# Patient Record
Sex: MALE | Race: OTHER | Employment: FULL TIME | URBAN - METROPOLITAN AREA
[De-identification: names, ages, dates, MRNs, and addresses within clinical notes are randomized per-mention and may not be internally consistent; named-entity substitution may affect disease eponyms.]

---

## 2017-04-20 ENCOUNTER — HOSPITAL ENCOUNTER (OUTPATIENT)
Dept: LAB | Age: 47
Discharge: HOME | End: 2017-04-20

## 2017-04-20 LAB — SENTARA SPECIMEN COL,SENBCF: NORMAL

## 2017-04-20 PROCEDURE — 99001 SPECIMEN HANDLING PT-LAB: CPT | Performed by: FAMILY MEDICINE

## 2018-03-06 ENCOUNTER — HOSPITAL ENCOUNTER (OUTPATIENT)
Dept: PREADMISSION TESTING | Age: 48
Discharge: HOME | End: 2018-03-06
Payer: COMMERCIAL

## 2018-03-06 DIAGNOSIS — Z01.818 PRE-OP TESTING: ICD-10-CM

## 2018-03-06 LAB
ANION GAP SERPL CALC-SCNC: 6 MMOL/L (ref 3–18)
ATRIAL RATE: 76 BPM
BUN SERPL-MCNC: 13 MG/DL (ref 7–18)
BUN/CREAT SERPL: 15 (ref 12–20)
CALCIUM SERPL-MCNC: 9.4 MG/DL (ref 8.5–10.1)
CALCULATED P AXIS, ECG09: 22 DEGREES
CALCULATED R AXIS, ECG10: 13 DEGREES
CALCULATED T AXIS, ECG11: 8 DEGREES
CHLORIDE SERPL-SCNC: 104 MMOL/L (ref 100–108)
CO2 SERPL-SCNC: 28 MMOL/L (ref 21–32)
CREAT SERPL-MCNC: 0.89 MG/DL (ref 0.6–1.3)
DIAGNOSIS, 93000: NORMAL
ERYTHROCYTE [DISTWIDTH] IN BLOOD BY AUTOMATED COUNT: 14.2 % (ref 11.6–14.5)
GLUCOSE SERPL-MCNC: 88 MG/DL (ref 74–99)
HCT VFR BLD AUTO: 39.5 % (ref 36–48)
HGB BLD-MCNC: 13.4 G/DL (ref 13–16)
MCH RBC QN AUTO: 28.5 PG (ref 24–34)
MCHC RBC AUTO-ENTMCNC: 33.9 G/DL (ref 31–37)
MCV RBC AUTO: 83.9 FL (ref 74–97)
P-R INTERVAL, ECG05: 138 MS
PLATELET # BLD AUTO: 292 K/UL (ref 135–420)
PMV BLD AUTO: 8.7 FL (ref 9.2–11.8)
POTASSIUM SERPL-SCNC: 4.5 MMOL/L (ref 3.5–5.5)
Q-T INTERVAL, ECG07: 378 MS
QRS DURATION, ECG06: 78 MS
QTC CALCULATION (BEZET), ECG08: 425 MS
RBC # BLD AUTO: 4.71 M/UL (ref 4.7–5.5)
SODIUM SERPL-SCNC: 138 MMOL/L (ref 136–145)
VENTRICULAR RATE, ECG03: 76 BPM
WBC # BLD AUTO: 7.5 K/UL (ref 4.6–13.2)

## 2018-03-06 PROCEDURE — 85027 COMPLETE CBC AUTOMATED: CPT | Performed by: SURGERY

## 2018-03-06 PROCEDURE — 36415 COLL VENOUS BLD VENIPUNCTURE: CPT | Performed by: SURGERY

## 2018-03-06 PROCEDURE — 93005 ELECTROCARDIOGRAM TRACING: CPT

## 2018-03-06 PROCEDURE — 80048 BASIC METABOLIC PNL TOTAL CA: CPT | Performed by: SURGERY

## 2018-03-08 ENCOUNTER — HOSPITAL ENCOUNTER (OUTPATIENT)
Dept: LAB | Age: 48
Discharge: HOME | End: 2018-03-08

## 2018-03-08 PROCEDURE — 88304 TISSUE EXAM BY PATHOLOGIST: CPT | Performed by: SURGERY

## 2018-03-08 PROCEDURE — 88305 TISSUE EXAM BY PATHOLOGIST: CPT | Performed by: SURGERY

## 2018-05-16 ENCOUNTER — HOSPITAL ENCOUNTER (OUTPATIENT)
Dept: PHYSICAL THERAPY | Age: 48
Discharge: HOME | End: 2018-05-16
Payer: COMMERCIAL

## 2018-05-16 PROCEDURE — 97014 ELECTRIC STIMULATION THERAPY: CPT

## 2018-05-16 PROCEDURE — 97162 PT EVAL MOD COMPLEX 30 MIN: CPT

## 2018-05-16 NOTE — PROGRESS NOTES
PHYSICAL THERAPY - DAILY TREATMENT NOTE    Patient Name: Juany Hardin        Date: 2018  : 1970   YES Patient  Verified  Visit #:     Insurance: Payor: Nicola Backer / Plan: [place] [employer] [place] / Product Type: PPO /      In time: 910 Out time: 10   Total Treatment Time: 50     TREATMENT AREA =  Neck pain [M54.2]  Back pain [M54.9]  Left arm pain [M79.602]    SUBJECTIVE                                                         See IE            OBJECTIVE    Modality rationale: decrease inflammation and decrease pain to improve the patients ability to perform ADLs with less pain. 15 min [] Estim, type: IFC to cervical region                                         [x]  att     []  unatt     []  w/US     []  w/ice    []  w/heat    min []  Mechanical Traction: type/lbs                                               []  pro   []  sup   []  int   []  cont    min []  Ultrasound, settings/location:      min []  Iontophoresis:  []  take home patch w/ dexamethazone    min                                []  in clinic w/ dexamethazone    min []  Ice     []  Heat     position:     min []  Other:    [] Skin assessment post-treatment (if applicable):    []  intact    []  redness- no adverse reaction     []redness  adverse reaction:      T/o tx min Patient Education:  YES  Reviewed HEP   [x] A and P related to current DX [x] LTGs and POC   []  Progressed/Changed HEP based on:         Other Objective/Functional Measures:                                   See IE     ASSESSMENT    [x]  See Initial Evaluation     PLAN    [x]  Upgrade activities as tolerated YES Continue plan of care   []  Discharge due to :    []  Other: Pt will return for follow up and to initiate POC     Therapist: Jayla Vu, PT, DPT, [address]    Date: 2018 Time: 9:22 AM       Future Appointments  Date Time Provider Jerrica Flanagan   2018 8:00 AM Kaz Minor PT [facility]   2018 9:30 AM Greg Gifford PT [facility]   2018 10:30 AM Aleta Amaya [facility]   5/25/2018 9:30 AM Maggie Ge [facility]   5/29/2018 9:00 AM Tish Rosas, PT [facility]   5/31/2018 10:00 AM Tish Rosas, PT [facility]   6/4/2018 10:00 AM Tish Rosas, PT [facility]   6/6/2018 10:00 AM Tish Rosas, PT [facility]   6/8/2018 10:00 AM Tish Rosas, PT [facility]

## 2018-05-17 ENCOUNTER — HOSPITAL ENCOUNTER (OUTPATIENT)
Dept: PHYSICAL THERAPY | Age: 48
Discharge: HOME | End: 2018-05-17
Payer: COMMERCIAL

## 2018-05-17 PROCEDURE — 97014 ELECTRIC STIMULATION THERAPY: CPT

## 2018-05-17 PROCEDURE — 97110 THERAPEUTIC EXERCISES: CPT

## 2018-05-17 PROCEDURE — 97140 MANUAL THERAPY 1/> REGIONS: CPT

## 2018-05-17 NOTE — PROGRESS NOTES
PHYSICAL THERAPY - DAILY TREATMENT NOTE    Patient Name: Sixto Dotson        Date: 2018  : 1970   YES Patient  Verified  Visit #:   2   of   16  Insurance: Payor: Barbee Mcardle / Plan: [place] [employer] / Product Type: PPO /      In time: 810 Out time: 955   Total Treatment Time: 40     Medicare Time Tracking (below)   Total Timed Codes (min):   1:1 Treatment Time:       TREATMENT AREA = Neck pain [M54.2]  Back pain [M54.9]  Left arm pain [M79.602]    SUBJECTIVE  Pain Level (on 0 to 10 scale):  6  / 10   Medication Changes/New allergies or changes in medical history, any new surgeries or procedures?     NO    If yes, update Summary List   Subjective Functional Status/Changes:  []  No changes reported     Most sign pain in L arm        OBJECTIVE    15 min Therapeutic Exercise:  [x]  See flow sheet   Rationale:      increase ROM and dec neural compromise to improve the patients ability to perform ADLs/ work with less paon     15 min Manual Therapy: Technique:      [x] STM[]IASTM[]TPR[x]PROM  - L GH, L hip  [x] Stretching- Cx rot/ UT,   [] SOR[] man traction[]   []OP with REIL  []Jt manipulation []Gr I [] II []  III [] IV[] V[]    Treatment Area:  CS, L GH, L Hip   Rationale:      decrease pain, increase ROM, increase tissue extensibility and decrease trigger points to improve patient's ability to perform ADLs and work activity        Modalities Rationale:     decrease pain and increase tissue extensibility to improve patient's ability to relax  10 min [x] Estim, type/location: ifc to CS                                     []  att     []  unatt     []  w/US     []  w/ice    [x]  w/heat    min []  Mechanical Traction: type/lbs                   []  pro   []  sup   []  int   []  cont    []  before manual    []  after manual    min []  Ultrasound, settings/location:      min []  Iontophoresis w/ dexamethasone, location:                                               []  take home patch       []  in clinic    min [] Ice     []  Heat    location/position:     min []  Vasopneumatic Device, press/temp:     min []  Other:    [x] Skin assessment post-treatment (if applicable):    [x]  intact    [x]  redness- no adverse reaction     []redness  adverse reaction:         min Gait Training:    Rationale:        throughout Rx min Patient Education:  YES  Reviewed HEP   []  Progressed/Changed HEP based on: Other Objective/Functional Measures:    Cx mobility mod restricted with active ROM ext, [place] rot, mild limitation passive rotation    RRIS- Dec intensity     Instructed pt in Cx retract and ulnar nerve glides for HEP- provided written instruction     Post Treatment Pain Level (on 0 to 10) scale:   5  / 10     ASSESSMENT  Assessment/Changes in Function:     Functional improvement:  Progressed HEP for improved mobility/ dec pain   Functional limitation:  Lifting/ work activity      []  See Progress Note/Recertification   Patient will continue to benefit from skilled PT services to modify and progress therapeutic interventions, address functional mobility deficits, address ROM deficits, address strength deficits, analyze and address soft tissue restrictions and analyze and cue movement patterns to attain remaining goals.    Progress toward goals / Updated goals:    Progressed HEP     PLAN  [x]  Upgrade activities as tolerated YES Continue plan of care   []  Discharge due to :    []  Other:      Therapist: Marisabel Molina PT    Date: 5/17/2018 Time: 8:10 AM     Future Appointments  Date Time Provider Jerrica Flanagan   5/18/2018 9:30 AM Delroy Payne PT [facility]   5/21/2018 10:30 AM Demarco Bundy V PTA [facility]   5/25/2018 9:30 AM Tegan Moore PTA [facility]   5/29/2018 9:00 AM Marisabel Molina PT [facility]   5/31/2018 10:00 AM Marisabel Molina PT [facility]   6/4/2018 10:00 AM Marisabel Molina PT [facility]   6/6/2018 10:00 AM Marisabel Molina PT [facility]   6/8/2018 10:00 AM Marisabel Molina PT [facility]

## 2018-05-18 ENCOUNTER — HOSPITAL ENCOUNTER (OUTPATIENT)
Dept: PHYSICAL THERAPY | Age: 48
Discharge: HOME | End: 2018-05-18
Payer: COMMERCIAL

## 2018-05-18 PROCEDURE — 97140 MANUAL THERAPY 1/> REGIONS: CPT

## 2018-05-18 NOTE — PROGRESS NOTES
PHYSICAL THERAPY - DAILY TREATMENT NOTE      Patient Name: Juany Hardin        Date: 2018  : 1970   YES Patient  Verified  Visit #:   3   of   16  Insurance: Payor: Nicola Backer / Plan: [employer] / Product Type: PPO /      In time: 9:35 Out time: 10:14   Total Treatment Time: 29       TREATMENT AREA = Neck pain [M54.2]  Back pain [M54.9]  Left arm pain [M79.602]    SUBJECTIVE    Pain Level (on 0 to 10 scale):  6  / 10   Medication Changes/New allergies or changes in medical history, any new surgeries or procedures? NO    If yes, update Summary List   Subjective Functional Status/Changes:  []  No changes reported     L UE posterior-lateral pain with tingling radiating to L 5th digit       OBJECTIVE    5 min Therapeutic Exercise:  [x]  See flow sheet   Rationale:      increase ROM to improve the patients ability to perform all UE ADL's       24 min Manual Therapy:  c/s paraspinal/scalene/SCM/UT STM. Sustained R c/s SB. manual c/s tx.  Manual chin tuck with c/s ext   Rationale:      decrease pain to improve patient's ability to perform all UE ADL's without pain    Modalities Rationale:     decrease pain to improve patient's ability to perform all UE ADL's without pain     10 min [x] Estim, type/location:  IFC/neck                                    []  att     [x]  unatt     []  w/US     []  w/ice    [x]  w/heat    min []  Mechanical Traction: type/lbs                   []  pro   []  sup   []  int   []  cont    []  before manual    []  after manual    min []  Ultrasound, settings/location:      min []  Iontophoresis w/ dexamethasone, location:                                               []  take home patch       []  in clinic    min []  Ice     []  Heat    location/position:     min []  Vasopneumatic Device, press/temp:     min []  Other:    [x] Skin assessment post-treatment (if applicable):    [x]  intact    [x]  redness- no adverse reaction     []redness  adverse reaction:      Throughout Tx min Patient Education:  YES  Reviewed HEP   []  Progressed/Changed HEP based on: Other Objective/Functional Measures:    6/10 L UE pain and tingling pre-Tx   Post Treatment Pain Level (on 0 to 10) scale:   3  / 10     ASSESSMENT    Assessment/Changes in Function:     3/10 L UE pain post-Tx     []  See Progress Note/Recertification   Patient will continue to benefit from skilled PT services to modify and progress therapeutic interventions, address functional mobility deficits, address ROM deficits, address strength deficits, analyze and address soft tissue restrictions, analyze and cue movement patterns, analyze and modify body mechanics/ergonomics and assess and modify postural abnormalities to attain remaining goals.    Progress toward goals / Updated goals:    Continue c/s ext protocol to meet all goals     PLAN    [x]  Upgrade activities as tolerated YES Continue plan of care   []  Discharge due to :    []  Other:      Therapist: Melyssa Henry PT    Date: 5/18/2018 Time: 10:57 AM   Future Appointments  Date Time Provider Jerrica Flanagan   5/21/2018 10:30 AM Saint Kaufmann, PTA [facility]   5/25/2018 9:30 AM Caitlyn Jefferson V PTA [facility]   5/29/2018 9:00 AM Marine Rosario PT [facility]   5/31/2018 10:00 AM Marine Rosario PT [facility]   6/4/2018 10:00 AM Marine Rosario PT [facility]   6/6/2018 10:00 AM Marine Rosario PT [facility]   6/8/2018 10:00 AM Marine Rosario PT [facility]

## 2018-05-21 ENCOUNTER — HOSPITAL ENCOUNTER (OUTPATIENT)
Dept: PHYSICAL THERAPY | Age: 48
Discharge: HOME | End: 2018-05-21
Payer: COMMERCIAL

## 2018-05-21 PROCEDURE — 97014 ELECTRIC STIMULATION THERAPY: CPT

## 2018-05-21 PROCEDURE — 97140 MANUAL THERAPY 1/> REGIONS: CPT

## 2018-05-21 PROCEDURE — 97110 THERAPEUTIC EXERCISES: CPT

## 2018-05-25 ENCOUNTER — HOSPITAL ENCOUNTER (OUTPATIENT)
Dept: PHYSICAL THERAPY | Age: 48
Discharge: HOME | End: 2018-05-25
Payer: COMMERCIAL

## 2018-05-25 PROCEDURE — 97110 THERAPEUTIC EXERCISES: CPT

## 2018-05-25 PROCEDURE — 97140 MANUAL THERAPY 1/> REGIONS: CPT

## 2018-05-25 NOTE — PROGRESS NOTES
PHYSICAL THERAPY - DAILY TREATMENT NOTE      Patient Name: Glendy Salas        Date: 2018  : 1970   YES Patient  Verified  Visit #:   5   of   16  Insurance: Payor: Elena Brain / Plan: [place] [employer]PO / Product Type: PPO /      In time: 9:40 Out time: 10:30   Total Treatment Time: 40     TREATMENT AREA = Neck pain [M54.2]  Back pain [M54.9]  Left arm pain [M79.602]    SUBJECTIVE    Pain Level (on 0 to 10 scale):  4-5  / 10   Medication Changes/New allergies or changes in medical history, any new surgeries or procedures? NO    If yes, update Summary List   Subjective Functional Status/Changes:  []  No changes reported     Pt reported no longer having tingling in L elbow; L UE pain is less and remains in L UT region. Insidious onset of R UE pain yesterday, which has subsided today       OBJECTIVE    30 min Therapeutic Exercise:  [x]  See flow sheet   Rationale:      increase ROM and increase strength to improve the patients ability to perform all work duties       10 min Manual Therapy:  c/s paraspinals/SCM/scalene/UT STM. R C3-C7 lateral glides. Sustained c/s R rot. Manual c/s tx.  Manual chin tuck with c/s ext   Rationale:      decrease pain to improve patient's ability to perform all UE ADL's    Modalities Rationale:     decrease pain to improve patient's ability to perform all UE ADL's without pain     10 min [x] Estim, type/location: IFC/neck                                    []  att     [x]  unatt     []  w/US     []  w/ice    [x]  w/heat    min []  Mechanical Traction: type/lbs                   []  pro   []  sup   []  int   []  cont    []  before manual    []  after manual    min []  Ultrasound, settings/location:      min []  Iontophoresis w/ dexamethasone, location:                                               []  take home patch       []  in clinic    min []  Ice     []  Heat    location/position:     min []  Vasopneumatic Device, press/temp:     min []  Other:    [x] Skin assessment post-treatment (if applicable):    [x]  intact    [x]  redness- no adverse reaction     []redness  adverse reaction:      Throughout Tx min Patient Education:  YES  Reviewed HEP   []  Progressed/Changed HEP based on: Other Objective/Functional Measures:    4-5/10 neck and L UE pain pre-Tx     Post Treatment Pain Level (on 0 to 10) scale:   0  / 10     ASSESSMENT    Assessment/Changes in Function:     0/10 neck and L UE pain post-Tx     []  See Progress Note/Recertification   Patient will continue to benefit from skilled PT services to modify and progress therapeutic interventions, address functional mobility deficits, address ROM deficits, address strength deficits, analyze and address soft tissue restrictions, analyze and cue movement patterns, analyze and modify body mechanics/ergonomics and assess and modify postural abnormalities to attain remaining goals.    Progress toward goals / Updated goals:    Continue cervical extension protocol to manage L UE radicular s/s     PLAN    [x]  Upgrade activities as tolerated YES Continue plan of care   []  Discharge due to :    []  Other:      Therapist: Kingsley Escobedo PT    Date: 5/25/2018 Time: 9:44 AM   Future Appointments  Date Time Provider Jerrica Flanagan   5/29/2018 9:00 AM Estephanie Perez PT [facility]   5/31/2018 10:00 AM Estephanie Perez PT [facility]   6/4/2018 10:00 AM Estephanie Perez PT [facility]   6/6/2018 10:00 AM Estephanie Perez PT [facility]   6/8/2018 10:00 AM Estephanie Perez PT [facility]

## 2018-05-29 ENCOUNTER — APPOINTMENT (OUTPATIENT)
Dept: PHYSICAL THERAPY | Age: 48
End: 2018-05-29
Payer: COMMERCIAL

## 2018-05-30 ENCOUNTER — HOSPITAL ENCOUNTER (OUTPATIENT)
Dept: PHYSICAL THERAPY | Age: 48
Discharge: HOME | End: 2018-05-30
Payer: COMMERCIAL

## 2018-05-30 PROCEDURE — 97140 MANUAL THERAPY 1/> REGIONS: CPT

## 2018-05-30 PROCEDURE — 97012 MECHANICAL TRACTION THERAPY: CPT

## 2018-05-30 PROCEDURE — 97110 THERAPEUTIC EXERCISES: CPT

## 2018-05-30 NOTE — PROGRESS NOTES
PHYSICAL THERAPY - DAILY TREATMENT NOTE    Patient Name: Bhavesh Maxwell        Date: 2018  : 1970   YES Patient  Verified  Visit #:   6   of   16  Insurance: Payor: Kath Ashton / Plan: [employer] PPO / Product Type: PPO /      In time:  Out time:    Total Treatment Time: 50     Medicare Time Tracking (below)   Total Timed Codes (min):   1:1 Treatment Time:       TREATMENT AREA = Neck pain [M54.2]  Back pain [M54.9]  Left arm pain [M79.602]    SUBJECTIVE  Pain Level (on 0 to 10 scale):  4  / 10   Medication Changes/New allergies or changes in medical history, any new surgeries or procedures? NO    If yes, update Summary List   Subjective Functional Status/Changes:  []  No changes reported     Pain intensity down a little. L UE symptoms moving up the arm.   Now Sx are just in upper arm        OBJECTIVE    25 min Therapeutic Exercise:  [x]  See flow sheet   Rationale:      increase ROM and increase strength to improve the patients ability to perform ADLs with less pain     15 min Manual Therapy: Technique:      [] STM[]IASTM[x]TPR[]PROM[x] Stretching  [] SOR[x] man traction[]   []OP with REIL  []Jt manipulation []Gr I [] II []  III [] IV[] V[]    Treatment Area:  CS/ scap region   Rationale:      decrease pain, increase ROM, increase tissue extensibility and decrease trigger points to improve patient's ability to perform ADLs        Modalities Rationale:     decrease pain, increase tissue extensibility and dec neural compromise to improve patient's ability to perform ADLs   min [] Estim, type/location:                                      []  att     []  unatt     []  w/US     []  w/ice    []  w/heat   10 min [x]  Mechanical Traction: type/lbs 22#                  []  pro   []  sup   []  int   []  cont    []  before manual    []  after manual    min []  Ultrasound, settings/location:      min []  Iontophoresis w/ dexamethasone, location:                                               []  take home patch       []  in clinic    min []  Ice     []  Heat    location/position:     min []  Vasopneumatic Device, press/temp:     min []  Other:    [x] Skin assessment post-treatment (if applicable):    [x]  intact    [x]  redness- no adverse reaction     []redness  adverse reaction:         min Gait Training:    Rationale:        throughout Rx min Patient Education:  YES  Reviewed HEP   []  Progressed/Changed HEP based on: Other Objective/Functional Measures:    *  Added traction and upper back stability ex    Reports dec in radic signs   Post Treatment Pain Level (on 0 to 10) scale:   3-4 / 10     ASSESSMENT  Assessment/Changes in Function:     Functional improvement:  Dec radic signs   Functional limitation:        []  See Progress Note/Recertification   Patient will continue to benefit from skilled PT services to modify and progress therapeutic interventions, address functional mobility deficits, address ROM deficits, address strength deficits and analyze and address soft tissue restrictions to attain remaining goals.    Progress toward goals / Updated goals:    Steady progress     PLAN  [x]  Upgrade activities as tolerated YES Continue plan of care   []  Discharge due to :    []  Other:      Therapist: William Rome PT    Date: 5/30/2018 Time: 10:48 AM     Future Appointments  Date Time Provider Jerrica Flanagan   5/31/2018 10:00 AM William Rome PT [facility]   6/4/2018 10:00 AM William Rome PT [facility]   6/6/2018 10:00 AM William Rome PT [facility]   6/8/2018 10:00 AM William Rome PT [facility]

## 2018-05-31 ENCOUNTER — HOSPITAL ENCOUNTER (OUTPATIENT)
Dept: PHYSICAL THERAPY | Age: 48
Discharge: HOME | End: 2018-05-31
Payer: COMMERCIAL

## 2018-05-31 PROCEDURE — 97012 MECHANICAL TRACTION THERAPY: CPT

## 2018-05-31 PROCEDURE — 97110 THERAPEUTIC EXERCISES: CPT

## 2018-05-31 PROCEDURE — 97140 MANUAL THERAPY 1/> REGIONS: CPT

## 2018-05-31 NOTE — PROGRESS NOTES
PHYSICAL THERAPY - DAILY TREATMENT NOTE    Patient Name: Daphne Victor        Date: 2018  : 1970   YES Patient  Verified  Visit #:   7   of   16  Insurance: Payor: Donavan Rodríguez / Plan: [employer] PPO / Product Type: PPO /      In time: 1010 Out time: 1100   Total Treatment Time: 50     Medicare Time Tracking (below)   Total Timed Codes (min):   1:1 Treatment Time:       TREATMENT AREA = Neck pain [M54.2]  Back pain [M54.9]  Left arm pain [M79.602]    SUBJECTIVE  Pain Level (on 0 to 10 scale):  3-4   10 (elbow)   Medication Changes/New allergies or changes in medical history, any new surgeries or procedures? NO    If yes, update Summary List   Subjective Functional Status/Changes:  []  No changes reported     L elbow is painful with leaning on it or taping it.   No recent hand Sx       OBJECTIVE    25 min Therapeutic Exercise:  [x]  See flow sheet   Rationale:      increase ROM and increase strength to improve the patients ability to perform ADLs with less pain     15* min Manual Therapy: Technique:      [x] STM[]IASTM[x]TPR[]PROM[x] Stretching  [] SOR[x] man traction[]   []OP with REIL  []Jt manipulation []Gr I [] II []  III [] IV[] V[]    Treatment Area:  CS/ scap   Rationale:      decrease pain, increase ROM, increase tissue extensibility and decrease trigger points to improve patient's ability to perform ADLs with less pain        Modalities Rationale:     Dec neural compromise to improve patient's ability to perform ADLs   min [] Estim, type/location:                                      []  att     []  unatt     []  w/US     []  w/ice    []  w/heat   10 min []  Mechanical Traction: type/lbs 25#                  []  pro   []  sup   []  int   []  cont    []  before manual    []  after manual    min []  Ultrasound, settings/location:      min []  Iontophoresis w/ dexamethasone, location:                                               []  take home patch       []  in clinic    min []  Ice     [x] Heat    location/position:     min []  Vasopneumatic Device, press/temp:     min []  Other:    [x] Skin assessment post-treatment (if applicable):    [x]  intact    []  redness- no adverse reaction     []redness  adverse reaction:         min Gait Training:    Rationale:        throughout Rx min Patient Education:  YES  Reviewed HEP   []  Progressed/Changed HEP based on: Other Objective/Functional Measures:    bilat should mobility WNLs exc sl restricted MRE to upper Lx region     Post Treatment Pain Level (on 0 to 10) scale:   3  / 10     ASSESSMENT  Assessment/Changes in Function:     Functional improvement:  Less intense pain, better able to reach with L UE   Functional limitation:  Pain with leaning on L elbow      []  See Progress Note/Recertification   Patient will continue to benefit from skilled PT services to modify and progress therapeutic interventions, address functional mobility deficits, address ROM deficits, address strength deficits, analyze and address soft tissue restrictions and analyze and cue movement patterns to attain remaining goals.    Progress toward goals / Updated goals:    Steady progress with inc ROM, dec pain intensity     PLAN  [x]  Upgrade activities as tolerated YES Continue plan of care   []  Discharge due to :    []  Other:      Therapist: Teodoro Nieto PT    Date: 5/31/2018 Time: 10:17 AM     Future Appointments  Date Time Provider Jerrica Flanagan   6/4/2018 10:00 AM Teodoro Nieto PT [facility]   6/6/2018 10:00 AM Teodoro Nieto PT [facility]   6/8/2018 10:00 AM Teodoro Nieto PT [facility]

## 2018-06-04 ENCOUNTER — HOSPITAL ENCOUNTER (OUTPATIENT)
Dept: PHYSICAL THERAPY | Age: 48
Discharge: HOME | End: 2018-06-04
Payer: COMMERCIAL

## 2018-06-04 PROCEDURE — 97110 THERAPEUTIC EXERCISES: CPT

## 2018-06-04 PROCEDURE — 97530 THERAPEUTIC ACTIVITIES: CPT

## 2018-06-04 PROCEDURE — 97140 MANUAL THERAPY 1/> REGIONS: CPT

## 2018-06-04 PROCEDURE — 97012 MECHANICAL TRACTION THERAPY: CPT

## 2018-06-04 NOTE — PROGRESS NOTES
PHYSICAL THERAPY - DAILY TREATMENT NOTE    Patient Name: Maribel Dotson        Date: 2018  : 1970   YES Patient  Verified  Visit #:   8   of   16  Insurance: Payor: Michi Johnson / Plan: [employer] / Product Type: PPO /      In time: 1010 Out time: 1115   Total Treatment Time: 65     Medicare Time Tracking (below)   Total Timed Codes (min):   1:1 Treatment Time:       TREATMENT AREA = Neck pain [M54.2]  Back pain [M54.9]  Left arm pain [M79.602]    SUBJECTIVE  Pain Level (on 0 to 10 scale):  3  / 10   Medication Changes/New allergies or changes in medical history, any new surgeries or procedures? NO    If yes, update Summary List   Subjective Functional Status/Changes:  []  No changes reported     Primary c/o L elbow pain.   Tx is helpful   No time to do ex's this weekend    Overall- 75% better       OBJECTIVE    30 min Therapeutic Exercise:  [x]  See flow sheet   Rationale:      increase ROM and increase strength to improve the patients ability to perform ADLs with less pain     15 min Manual Therapy: Technique:      [x] STM[]IASTM[x]TPR[]PROM[x] Stretching  [] SOR[x] man traction[]   []OP with REIL  []Jt manipulation []Gr I [] II []  III [] IV[] V[]    Treatment Area: CS/ scap    Rationale:      decrease pain, increase ROM, increase tissue extensibility and decrease trigger points to improve patient's ability to perform ADLs with less pain      Modalities Rationale:     decrease pain and dec neural compromise to improve patient's ability to perform ADLs with less pain   min [] Estim, type/location:                                      []  att     []  unatt     []  w/US     []  w/ice    []  w/heat   10 min [x]  Mechanical Traction: type/lbs 27#                  []  pro   []  sup   []  int   []  cont    []  before manual    [x]  after manual    min []  Ultrasound, settings/location:      min []  Iontophoresis w/ dexamethasone, location:                                               []  take home patch []  in clinic    min []  Ice     []  Heat    location/position:     min []  Vasopneumatic Device, press/temp:     min []  Other:    [x] Skin assessment post-treatment (if applicable):    [x]  intact    []  redness- no adverse reaction     []redness - adverse reaction:        10 min There act: Education of lifting mechanics. Pt demo ability to lift 40# floor to waist with good mechanics   Rationale:        throughout Rx min Patient Education:  YES  Reviewed HEP   []  Progressed/Changed HEP based on: Other Objective/Functional Measures:    Tender to palpation at olecranon     Post Treatment Pain Level (on 0 to 10) scale:   <3  / 10     ASSESSMENT  Assessment/Changes in Function:     Functional improvement: back at work some   Functional limitation:    Pain with lifting    []  See Progress Note/Recertification   Patient will continue to benefit from skilled PT services to modify and progress therapeutic interventions, address functional mobility deficits, address ROM deficits, address strength deficits, analyze and address soft tissue restrictions and analyze and cue movement patterns to attain remaining goals.    Progress toward goals / Updated goals:    Reaching LTG 1     PLAN  [x]  Upgrade activities as tolerated YES Continue plan of care   []  Discharge due to :    []  Other:      Therapist: Yuan Marquis PT    Date: 6/4/2018 Time: 10:35 AM     Future Appointments  Date Time Provider Jerrica Flanagan   6/6/2018 10:00 AM Yuan Marquis PT REHAB CENTER AT [facility]   6/8/2018 10:00 AM Yuan Marquis PT REHAB [facility]ENTER AT [facility]   6/11/2018 10:00 AM Yuan Marquis PT REHAB CENTER AT [facility]   6/13/2018 10:00 AM Yuan Marquis PT REHAB CENTER AT [facility]   6/14/2018 10:00 AM Yuan Marquis PT REHAB CENTER AT [facility]   6/18/2018 9:30 AM Yuan Marquis PT REHAB CENTER AT [facility]

## 2018-06-06 ENCOUNTER — HOSPITAL ENCOUNTER (OUTPATIENT)
Dept: PHYSICAL THERAPY | Age: 48
Discharge: HOME | End: 2018-06-06
Payer: COMMERCIAL

## 2018-06-06 PROCEDURE — 97012 MECHANICAL TRACTION THERAPY: CPT

## 2018-06-06 PROCEDURE — 97110 THERAPEUTIC EXERCISES: CPT

## 2018-06-06 PROCEDURE — 97140 MANUAL THERAPY 1/> REGIONS: CPT

## 2018-06-06 NOTE — PROGRESS NOTES
PHYSICAL THERAPY - DAILY TREATMENT NOTE    Patient Name: Leonard Márquez        Date: 2018  : 1970   YES Patient  Verified  Visit #:   9   of   16  Insurance: Payor: Alina Zamudio / Plan: [place] [employer] [place] / Product Type: PPO /      In time: 1020 Out time: 1130   Total Treatment Time: 70     Medicare Time Tracking (below)   Total Timed Codes (min):   1:1 Treatment Time:       TREATMENT AREA = Neck pain [M54.2]  Back pain [M54.9]  Left arm pain [M79.602]    SUBJECTIVE  Pain Level (on 0 to 10 scale):  3  / 10   Medication Changes/New allergies or changes in medical history, any new surgeries or procedures?     NO    If yes, update Summary List   Subjective Functional Status/Changes:  []  No changes reported     L elbow pain and slight pain in middle of the back     Hand Sx are gone    70-80% improved overall   OBJECTIVE    45 min Therapeutic Exercise:  [x]  See flow sheet   Rationale:      increase ROM and increase strength to improve the patients ability to perform ADLs with less pain     15 min Manual Therapy: Technique:      [x] STM[]IASTM[x]TPR[]PROM[x] Stretching  [] SOR[] man traction[]   []OP with REIL  []Jt manipulation []Gr I [] II []  III [] IV[] V[]    Treatment Area:  CS/ L scap   Rationale:      decrease pain, increase ROM, increase tissue extensibility and decrease trigger points to improve patient's ability to perform ADLs with less pain      Modalities Rationale:     decrease pain and dec neural compromise to improve patient's ability to perform ADLs/ work activity without pain   min [] Estim, type/location:                                      []  att     []  unatt     []  w/US     []  w/ice    []  w/heat   10 min [x]  Mechanical Traction: type/lbs 25#                  []  pro   []  sup   []  int   []  cont    []  before manual    []  after manual    min []  Ultrasound, settings/location:      min []  Iontophoresis w/ dexamethasone, location:                                               []  take home patch       []  in clinic    min []  Ice     []  Heat    location/position:     min []  Vasopneumatic Device, press/temp:     min []  Other:    [x] Skin assessment post-treatment (if applicable):    [x]  intact    [x]  redness- no adverse reaction     []redness - adverse reaction:         min Gait Training:    Rationale:        throughout Rx min Patient Education:  YES  Reviewed HEP   []  Progressed/Changed HEP based on: Other Objective/Functional Measures:    Inc lifts     Post Treatment Pain Level (on 0 to 10) scale:   2-3  / 10     ASSESSMENT  Assessment/Changes in Function:     Functional improvement:  Working without restrictions   Functional limitation:        []  See Progress Note/Recertification   Patient will continue to benefit from skilled PT services to modify and progress therapeutic interventions, address functional mobility deficits, address ROM deficits, address strength deficits, analyze and address soft tissue restrictions and analyze and cue movement patterns to attain remaining goals.    Progress toward goals / Updated goals:    Steady progress towards LTGs     PLAN  [x]  Upgrade activities as tolerated YES Continue plan of care   []  Discharge due to :    []  Other:      Therapist: No Oneal PT    Date: 6/6/2018 Time: 10:19 AM     Future Appointments  Date Time Provider Jerrica Flanagan   6/8/2018 10:00 AM No Oneal PT REHAB CENTER AT [facility]   6/11/2018 10:00 AM No Oneal PT REHAB CENTER AT [facility]   6/13/2018 10:00 AM No Oneal PT REHAB CENTER AT [facility]   6/14/2018 10:00 AM No Oneal PT REHAB CENTER AT [facility]   6/18/2018 9:30 AM No Oneal PT REHAB CENTER AT [facility]

## 2018-06-08 ENCOUNTER — HOSPITAL ENCOUNTER (OUTPATIENT)
Dept: PHYSICAL THERAPY | Age: 48
Discharge: HOME | End: 2018-06-08
Payer: COMMERCIAL

## 2018-06-08 PROCEDURE — 97110 THERAPEUTIC EXERCISES: CPT

## 2018-06-08 PROCEDURE — 97140 MANUAL THERAPY 1/> REGIONS: CPT

## 2018-06-08 NOTE — PROGRESS NOTES
[facility] PHYSICAL THERAPY AT [facility] [address], [address]  Phone: [phone]  Fax: [phone]  PROGRESS NOTE  Patient Name: Sixto Dotson : 1970   Treatment/Medical Diagnosis: Neck pain [M54.2]  Back pain [M54.9]  Left arm pain [M79.602]   Referral Source: Gwen March DO     Date of Initial Visit: 18 Attended Visits: 10 Missed Visits: -     SUMMARY OF TREATMENT  PT has consisted of manual therapy, therapeutic exercise, modalities for pain relief, and patient education of HEP, activity modification, posture and body mechanics  CURRENT STATUS  Patient reports 70-80% overall improvement. He states that hand symptoms are gone. He has returned to full duty work. He reports primary issue is of left elbow region pain made worse with prolonged activity     Previous Goals:  1. Decrease max pain 50-75% to assist with return to work. 2.  Improve FOTO score to 69 points to show functional improvement. 3.  Will rate a +3 on Global Rating of Change and be prepared to DC to HEP     Prior Level/Current Level:  1) Prior Level: na   Current Level: 70-80%   Goal Met? yes  2) Prior Level: 51%   Current Level: 68%   Goal Met? progressing  3) Prior Level: na   Current Level: +5   Goal Met? yes      New Goals to be achieved in __2-3__  weeks:  Continue LTG 2  4. Patient to return to previous ADLs with pain <= 2/10 at worst.    RECOMMENDATIONS  Cont PT 2x/week for 2-3 addl weeks    If you have any questions/comments please contact us directly at [phone]. Thank you for allowing us to assist in the care of your patient. Therapist Signature: Yuan Marquis PT Date: 2018     Time: 12:54 PM   NOTE TO PHYSICIAN:  PLEASE COMPLETE THE ORDERS BELOW AND FAX TO   [facility] at [address]: [phone].   If you are unable to process this request in 24 hours please contact our office: [phone].    ___ I have read the above report and request that my patient continue as recommended.   ___ I have read the above report and request that my patient continue therapy with the following changes/special instructions:_________________________________________________________   ___ I have read the above report and request that my patient be discharged from therapy.      Physician Signature:        Date:       Time:

## 2018-06-08 NOTE — PROGRESS NOTES
PHYSICAL THERAPY - DAILY TREATMENT NOTE    Patient Name: Za Jc        Date: 2018  : 1970   YES Patient  Verified  Visit #:   10   of   16  Insurance: Payor: Lisa Connors / Plan: [place] [employer] PPO / Product Type: PPO /      In time: 1020 Out time: 1110   Total Treatment Time: 50     Medicare Time Tracking (below)   Total Timed Codes (min):   1:1 Treatment Time:       TREATMENT AREA = Neck pain [M54.2]  Back pain [M54.9]  Left arm pain [M79.602]    SUBJECTIVE  Pain Level (on 0 to 10 scale):  2-3  / 10   Medication Changes/New allergies or changes in medical history, any new surgeries or procedures?     NO    If yes, update Summary List   Subjective Functional Status/Changes:  []  No changes reported     Pain still in L elbow region        OBJECTIVE    30* min Therapeutic Exercise:  [x]  See flow sheet   Rationale:      increase ROM and increase strength to improve the patients ability to perform ADLs/ work activity     10 min Manual Therapy: Technique:      [x] STM[]IASTM[x]TPR[]PROM[x] Stretching  [] SOR[] man traction[]   []OP with REIL  []Jt manipulation []Gr I [] II []  III [] IV[] V[]    Treatment Area:  CS/ L scap   Rationale:      decrease pain, increase ROM, increase tissue extensibility and decrease trigger points to improve patient's ability to perform ADLs/ work activity        Modalities Rationale:     decrease pain and increase tissue extensibility to improve patient's ability to perform ADLs   min [] Estim, type/location:                                      []  att     []  unatt     []  w/US     []  w/ice    []  w/heat    min []  Mechanical Traction: type/lbs                   []  pro   []  sup   []  int   []  cont    []  before manual    []  after manual    min []  Ultrasound, settings/location:      min []  Iontophoresis w/ dexamethasone, location:                                               []  take home patch       []  in clinic   10 min []  Ice     [x]  Heat    location/position: min []  Vasopneumatic Device, press/temp:     min []  Other:    [x] Skin assessment post-treatment (if applicable):    [x]  intact    [x]  redness- no adverse reaction     []redness - adverse reaction:         min Gait Training:    Rationale:        throughout Rx min Patient Education:  YES  Reviewed HEP   []  Progressed/Changed HEP based on: Other Objective/Functional Measures:    Lifts 50# with good mechanics  GROC= +5    FOTO= 51%   Post Treatment Pain Level (on 0 to 10) scale:   2  / 10     ASSESSMENT  Assessment/Changes in Function:     Functional improvement:  RTW   Functional limitation:  Arm pain washing car      []  See Progress Note/Recertification   Patient will continue to benefit from skilled PT services to modify and progress therapeutic interventions, address functional mobility deficits, address ROM deficits, address strength deficits, analyze and address soft tissue restrictions, analyze and cue movement patterns and analyze and modify body mechanics/ergonomics to attain remaining goals.    Progress toward goals / Updated goals:    See PN     PLAN  []  Upgrade activities as tolerated YES Continue plan of care   []  Discharge due to :    []  Other:      Therapist: Dav Craft PT    Date: 6/8/2018 Time: 10:18 AM     Future Appointments  Date Time Provider Jerrica Flanagan   6/11/2018 10:00 AM Dav Craft PT REHAB CENTER AT [address]   6/13/2018 10:00 AM Dav Craft, PT REHAB CENTER AT [address]   6/14/2018 10:00 AM Dav Craft, PT REHAB CENTER AT [address]   6/18/2018 9:30 AM Dav Craft, PT REHAB CENTER AT [address]

## 2018-06-11 ENCOUNTER — HOSPITAL ENCOUNTER (OUTPATIENT)
Dept: PHYSICAL THERAPY | Age: 48
Discharge: HOME | End: 2018-06-11
Payer: COMMERCIAL

## 2018-06-11 PROCEDURE — 97110 THERAPEUTIC EXERCISES: CPT

## 2018-06-11 PROCEDURE — 97140 MANUAL THERAPY 1/> REGIONS: CPT

## 2018-06-11 NOTE — PROGRESS NOTES
PHYSICAL THERAPY - DAILY TREATMENT NOTE    Patient Name: Gurdeep Mack        Date: 2018  : 1970   YES Patient  Verified  Visit #:      16  Insurance: Payor: Florin Going / Plan: [place] [employer] PPO / Product Type: PPO /      In time: 1005 Out time: 1110   Total Treatment Time: 65     Medicare Time Tracking (below)   Total Timed Codes (min):   1:1 Treatment Time:       TREATMENT AREA = Neck pain [M54.2]  Back pain [M54.9]  Left arm pain [M79.602]    SUBJECTIVE  Pain Level (on 0 to 10 scale):  2-3  / 10   Medication Changes/New allergies or changes in medical history, any new surgeries or procedures? NO    If yes, update Summary List   Subjective Functional Status/Changes:  []  No changes reported     Feeling OK --elbow pain is going away. Doesn't hurt as bad to lean it on the table.        OBJECTIVE    45 min Therapeutic Exercise:  [x]  See flow sheet   Rationale:      increase ROM and increase strength to improve the patients ability to perform ADL and work activity     10 min Manual Therapy: Technique:      [x] STM[]IASTM[x]TPR[]PROM[x] Stretching  [] SOR[x] man traction[]   []OP with REIL  []Jt manipulation []Gr I [] II []  III [] IV[] V[]    Treatment Area: CS/ scap region    Rationale:      decrease pain, increase ROM, increase tissue extensibility and increase postural awareness to improve patient's ability to perform ADL/ work activity with less pain        Modalities Rationale:     decrease pain and increase tissue extensibility to improve patient's ability to relax   min [] Estim, type/location:                                      []  att     []  unatt     []  w/US     []  w/ice    []  w/heat    min []  Mechanical Traction: type/lbs                   []  pro   []  sup   []  int   []  cont    []  before manual    []  after manual    min []  Ultrasound, settings/location:      min []  Iontophoresis w/ dexamethasone, location:                                               []  take home patch []  in clinic   10 min []  Ice     [x]  Heat    location/position:     min []  Vasopneumatic Device, press/temp:     min []  Other:    [x] Skin assessment post-treatment (if applicable):    [x]  intact    [x]  redness- no adverse reaction     []redness - adverse reaction:         min Gait Training:    Rationale:        throughout Rx min Patient Education:  YES  Reviewed HEP   []  Progressed/Changed HEP based on: Other Objective/Functional Measures: Added wall clocks and body blade for stability     Post Treatment Pain Level (on 0 to 10) scale:   2  / 10     ASSESSMENT  Assessment/Changes in Function:     Functional improvement:  Less pain   Functional limitation:        []  See Progress Note/Recertification   Patient will continue to benefit from skilled PT services to modify and progress therapeutic interventions, address functional mobility deficits, address ROM deficits, address strength deficits and analyze and address soft tissue restrictions to attain remaining goals.    Progress toward goals / Updated goals:    Steady progress     PLAN  [x]  Upgrade activities as tolerated YES Continue plan of care   []  Discharge due to :    []  Other:      Therapist: Desi Brandt PT    Date: 6/11/2018 Time: 10:12 AM     Future Appointments  Date Time Provider Jerrica Flanagan   6/13/2018 10:00 AM Desi Brandt PT REHAB CENTER AT [facility]   6/14/2018 10:00 AM Desi Brandt PT REHAB CENTER AT [facility]   6/18/2018 9:30 AM Desi Brandt PT REHAB CENTER AT [facility]

## 2018-06-13 ENCOUNTER — HOSPITAL ENCOUNTER (OUTPATIENT)
Dept: PHYSICAL THERAPY | Age: 48
Discharge: HOME | End: 2018-06-13
Payer: COMMERCIAL

## 2018-06-13 PROCEDURE — 97140 MANUAL THERAPY 1/> REGIONS: CPT

## 2018-06-13 PROCEDURE — 97110 THERAPEUTIC EXERCISES: CPT

## 2018-06-13 NOTE — PROGRESS NOTES
PHYSICAL THERAPY - DAILY TREATMENT NOTE    Patient Name: Sabino Salvage        Date: 2018  : 1970   YES Patient  Verified  Visit #:      16  Insurance: Payor: Addison Alexandre / Plan: [place] [employer] PPO / Product Type: PPO /      In time: 1005 Out time: 1115   Total Treatment Time: 70     Medicare Time Tracking (below)   Total Timed Codes (min):   1:1 Treatment Time:       TREATMENT AREA = Neck pain [M54.2]  Back pain [M54.9]  Left arm pain [M79.602]    SUBJECTIVE  Pain Level (on 0 to 10 scale):  2  / 10   Medication Changes/New allergies or changes in medical history, any new surgeries or procedures?     NO    If yes, update Summary List   Subjective Functional Status/Changes:  []  No changes reported     Doing good        OBJECTIVE    45 min Therapeutic Exercise:  [x]  See flow sheet   Rationale:      increase ROM and increase strength to improve the patients ability to perform ADLs with less pain     15 min Manual Therapy: Technique:      [x] STM[]IASTM[x]TPR[]PROM[x] Stretching  [] SOR[x] man traction[]   []OP with REIL  []Jt manipulation []Gr I [] II []  III [] IV[] V[]    Treatment Area:CS/ scap     Rationale:      decrease pain, increase ROM, increase tissue extensibility and decrease trigger points to improve patient's ability to perform ADLs with less pain        Modalities Rationale:     decrease pain and increase tissue extensibility to improve patient's ability to relax   min [] Estim, type/location:                                      []  att     []  unatt     []  w/US     []  w/ice    []  w/heat    min []  Mechanical Traction: type/lbs                   []  pro   []  sup   []  int   []  cont    []  before manual    []  after manual    min []  Ultrasound, settings/location:      min []  Iontophoresis w/ dexamethasone, location:                                               []  take home patch       []  in clinic   10 min []  Ice     [x]  Heat    location/position:     min []  Vasopneumatic Device, press/temp:     min []  Other:    [x] Skin assessment post-treatment (if applicable):    [x]  intact    [x]  redness- no adverse reaction     []redness - adverse reaction:         min Gait Training:    Rationale:        throughout Rx min Patient Education:  YES  Reviewed HEP   []  Progressed/Changed HEP based on: Other Objective/Functional Measures:    Inc lifts to 60# with good mechanics     Post Treatment Pain Level (on 0 to 10) scale:   0  / 10     ASSESSMENT  Assessment/Changes in Function:     Functional improvement:  Improved material handling   Functional limitation:        []  See Progress Note/Recertification   Patient will continue to benefit from skilled PT services to modify and progress therapeutic interventions, address functional mobility deficits, address ROM deficits, address strength deficits, analyze and address soft tissue restrictions and assess and modify postural abnormalities to attain remaining goals.    Progress toward goals / Updated goals:    Less intense pain     PLAN  [x]  Upgrade activities as tolerated YES Continue plan of care   []  Discharge due to :    []  Other:      Therapist: Santa Snyder PT    Date: 6/13/2018 Time: 10:18 AM     Future Appointments  Date Time Provider Jerrica Flanagan   6/14/2018 10:00 AM Santa Snyder PT REHAB CENTER AT [facility]   6/18/2018 9:30 AM Santa Snyder PT REHAB CENTER AT [facility]

## 2018-06-14 ENCOUNTER — APPOINTMENT (OUTPATIENT)
Dept: PHYSICAL THERAPY | Age: 48
End: 2018-06-14
Payer: COMMERCIAL

## 2018-06-15 ENCOUNTER — HOSPITAL ENCOUNTER (OUTPATIENT)
Dept: PHYSICAL THERAPY | Age: 48
Discharge: HOME | End: 2018-06-15
Payer: COMMERCIAL

## 2018-06-15 PROCEDURE — 97110 THERAPEUTIC EXERCISES: CPT

## 2018-06-15 PROCEDURE — 97140 MANUAL THERAPY 1/> REGIONS: CPT

## 2018-06-15 NOTE — PROGRESS NOTES
PHYSICAL THERAPY - DAILY TREATMENT NOTE      Patient Name: Gabriella Wetzel        Date: 6/15/2018  : 1970   YES Patient  Verified  Visit #:   15   of   24  Insurance: Payor: Juan Chambers / Plan: [employer] PPO / Product Type: PPO /      In time: 9:40 Out time: 10:45   Total Treatment Time: 55       TREATMENT AREA = Neck pain [M54.2]  Back pain [M54.9]  Left arm pain [M79.602]    SUBJECTIVE    Pain Level (on 0 to 10 scale):  1  / 10   Medication Changes/New allergies or changes in medical history, any new surgeries or procedures? NO    If yes, update Summary List   Subjective Functional Status/Changes:  []  No changes reported     Pt reported mild posterior L elbow pain       OBJECTIVE    45 min Therapeutic Exercise:  [x]  See flow sheet   Rationale:      increase ROM and increase strength to improve the patients ability to perform all UE ADL's       10 min Manual Therapy:  c/s paraspinals/SCM/scalene/UT STM. R SB sustained PROM.  Manual tx   Rationale:      decrease pain to improve patient's ability to perform all UE ADL's    Modalities Rationale:     decrease pain to improve patient's ability to perform all UE ADL's      min [] Estim, type/location:                                      []  att     []  unatt     []  w/US     []  w/ice    []  w/heat    min []  Mechanical Traction: type/lbs                   []  pro   []  sup   []  int   []  cont    []  before manual    []  after manual    min []  Ultrasound, settings/location:      min []  Iontophoresis w/ dexamethasone, location:                                               []  take home patch       []  in clinic   10 min []  Ice     [x]  Heat    location/position: Neck/supine    min []  Vasopneumatic Device, press/temp:     min []  Other:    [x] Skin assessment post-treatment (if applicable):    [x]  intact    [x]  redness- no adverse reaction     []redness - adverse reaction:      Throughout Tx min Patient Education:  YES  Reviewed HEP   [] Progressed/Changed HEP based on: Other Objective/Functional Measures:    1/10 L UE pain pre-Tx     Post Treatment Pain Level (on 0 to 10) scale:   0  / 10     ASSESSMENT    Assessment/Changes in Function:     0/10 L UE pain post-Tx     []  See Progress Note/Recertification   Patient will continue to benefit from skilled PT services to modify and progress therapeutic interventions, address functional mobility deficits, address ROM deficits, address strength deficits, analyze and address soft tissue restrictions, analyze and cue movement patterns, analyze and modify body mechanics/ergonomics and assess and modify postural abnormalities to attain remaining goals.    Progress toward goals / Updated goals:    Continue strengthening to meet all goals     PLAN    [x]  Upgrade activities as tolerated YES Continue plan of care   []  Discharge due to :    []  Other:      Therapist: Melyssa Henry PT    Date: 6/15/2018 Time: 10:09 AM   Future Appointments  Date Time Provider Jerrica Flanagan   6/18/2018 9:30 AM Marine Rosario, PT REHAB CENTER AT [facility]

## 2018-06-18 ENCOUNTER — HOSPITAL ENCOUNTER (OUTPATIENT)
Dept: PHYSICAL THERAPY | Age: 48
Discharge: HOME | End: 2018-06-18
Payer: COMMERCIAL

## 2018-06-18 PROCEDURE — 97110 THERAPEUTIC EXERCISES: CPT

## 2018-06-18 PROCEDURE — 97140 MANUAL THERAPY 1/> REGIONS: CPT

## 2018-06-18 NOTE — PROGRESS NOTES
[]  [address] THERAPY AT [facility]. [place], [address] [facility] [address]  Phone: [phone]  Fax: [phone]  DISCHARGE NOTE            Patient Name: Lucrecia Gamboa : 1970   Treatment/Medical Diagnosis: Neck pain [M54.2]  Back pain [M54.9]  Left arm pain [M79.602]   Referral Source: Fred Quinn DO       Date of Initial Visit: 18 Attended Visits: 14 Missed Visits: -      SUMMARY OF TREATMENT  PT has consisted of manual therapy, therapeutic exercise, modalities for pain relief, and patient education of HEP, activity modification, posture and body mechanics  CURRENT STATUS  Patient reports 100% overall improvement. He states that hand and elbow symptoms are gone. He has returned to full duty work. He demonstrates the ability to work at the full medium physical demand level. Previous Goals:  1.  Decrease max pain 50-75% to assist with return to work. 2.  Improve FOTO score to 69 points to show functional improvement. 3.  Will rate a +3 on Global Rating of Change and be prepared to DC to HEP  4  Patient will return to all ADLs with pain <= 2/10                                Prior Level/Current Level:  1) Prior Level: 70-80%                           Current Level: 100%                           Goal Met? yes  2) Prior Level: 68%                           Current Level: 96%                           Goal Met? yes  3) Prior Level: na                           Current Level: +5                           Goal Met? Yes  3) Prior Level: 2/10                           Current Level: 1/10                           Goal Met? yes         RECOMMENDATIONS  Discharge from PT.  LTGs have been met     If you have any questions/comments please contact us directly at [phone].    Thank you for allowing us to assist in the care of your patient.     Therapist Signature: Marine Rosario, PT Date: 2018       Time: 9:40 aM

## 2018-06-18 NOTE — PROGRESS NOTES
PHYSICAL THERAPY - DAILY TREATMENT NOTE    Patient Name: Maria Luisa Rodas        Date: 2018  : 1970   YES Patient  Verified  Visit #:   14   of   14  Insurance: Payor: Khadra Conde / Plan: [place] [employer] PPO / Product Type: PPO /      In time: 935 Out time: 1030   Total Treatment Time: 55     Medicare Time Tracking (below)   Total Timed Codes (min):   1:1 Treatment Time:       TREATMENT AREA = Neck pain [M54.2]  Back pain [M54.9]  Left arm pain [M79.602]    SUBJECTIVE  Pain Level (on 0 to 10 scale):  0-1  / 10   Medication Changes/New allergies or changes in medical history, any new surgeries or procedures? NO    If yes, update Summary List   Subjective Functional Status/Changes:  []  No changes reported     100% improved.   No elbow Sx        OBJECTIVE    30 min Therapeutic Exercise:  [x]  See flow sheet   Rationale:      increase ROM and increase strength to improve the patients ability to perform ADLs     15 min Manual Therapy: Technique:      [x] STM[]IASTM[x]TPR[]PROM[x] Stretching  [] SOR[] man traction[]   []OP with REIL  []Jt manipulation []Gr I [] II []  III [] IV[] V[]    Treatment Area: Cs/ scap l    Rationale:      decrease pain, increase ROM, increase tissue extensibility and decrease trigger points to improve patient's ability to perform ADLs        Modalities Rationale:     decrease pain and increase tissue extensibility to improve patient's ability to perform ADLs with less pain   min [] Estim, type/location:                                      []  att     []  unatt     []  w/US     []  w/ice    []  w/heat    min []  Mechanical Traction: type/lbs                   []  pro   []  sup   []  int   []  cont    []  before manual    []  after manual    min []  Ultrasound, settings/location:      min []  Iontophoresis w/ dexamethasone, location:                                               []  take home patch       []  in clinic   10 min []  Ice     [x]  Heat    location/position:     min [] Vasopneumatic Device, press/temp:     min []  Other:    [x] Skin assessment post-treatment (if applicable):    [x]  intact    []  redness- no adverse reaction     []redness - adverse reaction:         min Gait Training:    Rationale:        throughout Rx min Patient Education:  YES  Reviewed HEP   []  Progressed/Changed HEP based on: Other Objective/Functional Measures:    See PN  FOTO= 96   Post Treatment Pain Level (on 0 to 10) scale:   0  / 10     ASSESSMENT  Assessment/Changes in Function:          [x]  See Progress Note/Recertification/ DC       PLAN  []  Upgrade activities as tolerated YES Continue plan of care   [x]  Discharge due to :    []  Other:      Therapist: Harry Bey PT    Date: 6/18/2018 Time: 10:00 AM     No future appointments.

## 2019-09-27 ENCOUNTER — HOSPITAL ENCOUNTER (OUTPATIENT)
Dept: LAB | Age: 49
Discharge: HOME | End: 2019-09-27

## 2019-09-27 LAB — XX-LABCORP SPECIMEN COL,LCBCF: NORMAL

## 2019-09-27 PROCEDURE — 99001 SPECIMEN HANDLING PT-LAB: CPT

## 2020-01-20 ENCOUNTER — HOSPITAL ENCOUNTER (OUTPATIENT)
Dept: PHYSICAL THERAPY | Age: 50
Discharge: HOME | End: 2020-01-20
Payer: COMMERCIAL

## 2020-01-20 PROCEDURE — 97162 PT EVAL MOD COMPLEX 30 MIN: CPT | Performed by: PHYSICAL THERAPIST

## 2020-01-20 PROCEDURE — 97530 THERAPEUTIC ACTIVITIES: CPT | Performed by: PHYSICAL THERAPIST

## 2020-01-20 PROCEDURE — 97140 MANUAL THERAPY 1/> REGIONS: CPT | Performed by: PHYSICAL THERAPIST
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
## 2020-01-24 ENCOUNTER — HOSPITAL ENCOUNTER (OUTPATIENT)
Dept: PHYSICAL THERAPY | Age: 50
Discharge: HOME | End: 2020-01-24
Payer: COMMERCIAL

## 2020-01-24 PROCEDURE — 97110 THERAPEUTIC EXERCISES: CPT

## 2020-01-24 PROCEDURE — 97140 MANUAL THERAPY 1/> REGIONS: CPT

## 2020-01-24 NOTE — PROGRESS NOTES
PT DAILY TREATMENT NOTE 10-18    Patient Name: Farida Alvarenga  Date:2020  : 1970  [x]  Patient  Verified  Payor: Hal Peters / Plan: Ju[employer] Kin[employer]ters / Product Type: HMO /    In time:11:10 Out time:11:55  Total Treatment Time (min): 39  Visit #: 2 of 16    Medicare/BCBS Only   Total Timed Codes (min):  39 1:1 Treatment Time:  23       Treatment Area: Strain of muscle, fascia and tendon of lower back, initial encounter [S39.012A]  Pain in left shoulder [M25.512]    SUBJECTIVE  Pain Level (0-10 scale): 7  Any medication changes, allergies to medications, adverse drug reactions, diagnosis change, or new procedure performed?: [x] No    [] Yes (see summary sheet for update)  Subjective functional status/changes:   [] No changes reported  \"Im doing exercise a little. \"  OBJECTIVE    Modality rationale: decrease edema, decrease inflammation, decrease pain and increase tissue extensibility to improve the patients ability to perform ADL   Min Type Additional Details    [] Estim:  []Unatt       []IFC  []Premod                        []Other:  []w/ice   []w/heat  Position:  Location:    [] Estim: []Att    []TENS instruct  []NMES                    []Other:  []w/US   []w/ice   []w/heat  Position:  Location:    []  Traction: [] Cervical       []Lumbar                       [] Prone          []Supine                       []Intermittent   []Continuous Lbs:  [] before manual  [] after manual    []  Ultrasound: []Continuous   [] Pulsed                           []1MHz   []3MHz W/cm2:  Location:    []  Iontophoresis with dexamethasone         Location: [] Take home patch   [] In clinic    []  Ice     []  heat  []  Ice massage  []  Laser   []  Anodyne Position:  Location:    []  Laser with stim  []  Other:  Position:  Location:    []  Vasopneumatic Device Pressure:       [] lo [] med [] hi   Temperature: [] lo [] med [] hi   [x] Skin assessment post-treatment:  [x]intact []redness- no adverse reaction    []redness  adverse reaction:      min []Eval                  []Re-Eval       24 min Therapeutic Exercise:  [x] See flow sheet :   Rationale: increase ROM and increase strength to improve the patients ability to perform ADL      min Therapeutic Activity:  []  See flow sheet :   Rationale: increase ROM and increase strength  to improve the patients ability to perform ADL       min Neuromuscular Re-education:  []  See flow sheet :   Rationale:  to improve the patients ability to    15 min Manual Therapy: [address] [facility] JT mob 4 lef t shoulder/CSP TX with passive stretch (B ) UT   supine   Rationale: decrease pain, increase ROM, increase tissue extensibility and decrease edema  to perform ADL      min Gait Training:  ___ feet with ___ device on level surfaces with ___ level of assist   Rationale: With   [x] TE   [] TA   [] neuro   [] other: Patient Education: [x] Review HEP    [] Progressed/Changed HEP based on:   [] positioning   [] body mechanics   [] transfers   [] heat/ice application    [] other:      Other Objective/Functional Measures:      Pain Level (0-10 scale) post treatment: 6-7    ASSESSMENT/Changes in Function: Mod tightness with ER during manual at left shoulder. Min/mod tightness with CSP TX. Overall fair response to each  There ex. Discussed with pt on importance of performing HEP 2x day. Patient will continue to benefit from skilled PT services to address functional mobility deficits, address ROM deficits, address strength deficits, analyze and address soft tissue restrictions and analyze and cue movement patterns to attain remaining goals. []  See Plan of Care  []  See progress note/recertification  []  See Discharge Summary         Progress towards goals / Updated goals:  1. Patient will be turn head for 15 secs without lasting discomfort. eval 10 secs  2. Patient will be able to walk for 45 min without needing a rest break to increase participation in home activities. Eval 30 min.   3. Patient will decrease pain recovery time by 5 secs (Eval: 10 secs).   4. Patient will demonstrate improvement of Ulnar nerve tension test by 45 degs of rotation. Eval: 0 degs     Long Term Goals: To be accomplished in 8 weeks:  5. Patient will be turn head for 30 secs without lasting discomfort. eval 10 secs  6. Patient will be able to walk vigorously for 45 min without needing a rest break to increase participation in home activities. Eval 30 min. 7. Patient will decrease pain recovery time by 10 secs (Eval: 10 secs).   8. Patient will demonstrate improvement of Ulnar nerve tension test by 90 degs of rotation, 45 degs extension.  Eval: 0 degs    PLAN  []  Upgrade activities as tolerated     []  Continue plan of care  []  Update interventions per flow sheet       []  Discharge due to:_  []  Other:_      Rosalba Palacio PTA 1/24/2020  11:31 AM    Future Appointments   Date Time Provider Jerrica Flanagan   1/28/2020 11:00 AM Filiberto Morse, PT [facility]   1/30/2020 11:30 AM Filiberto Morse, PT [facility]   2/4/2020 11:00 AM Rosalba Palacio, PTA [facility]   2/6/2020 11:00 AM Fabio Drew, PTA [facility]   2/11/2020 11:00 AM Filiberto Morse, PT [facility]   2/13/2020 11:00 AM Filiberto Morse, PT [facility]   2/18/2020 11:00 AM Filiberto Morse, PT [facility]   2/20/2020 11:00 AM Filiberto Morse, PT [facility]   2/25/2020 11:00 AM Filiberto Morse, PT [facility]   2/27/2020 11:00 AM Filiberto Morse, PT [facility]   3/3/2020 11:00 AM Fabio Offer, PTA [facility]   3/5/2020 11:00 AM Fabio Offer, PTA [facility]   3/10/2020 11:00 AM Fabio Offer, PTA [facility]   3/12/2020 11:00 AM Filiberto Morse, PT [facility]

## 2020-01-30 ENCOUNTER — HOSPITAL ENCOUNTER (OUTPATIENT)
Dept: PHYSICAL THERAPY | Age: 50
Discharge: HOME | End: 2020-01-30
Payer: COMMERCIAL

## 2020-01-30 PROCEDURE — 97140 MANUAL THERAPY 1/> REGIONS: CPT

## 2020-01-30 PROCEDURE — 97110 THERAPEUTIC EXERCISES: CPT

## 2020-01-30 NOTE — PROGRESS NOTES
PT DAILY TREATMENT NOTE 10-18    Patient Name: Carlos Tomlinson  Date:2020  : 1970  [x]  Patient  Verified  Payor: Yanique Allen / Plan: Cesar Shaw / Product Type: HMO /    In time:1135  Out time:1227  Total Treatment Time (min):52  Visit #: 3 of 16    Medicare/BCBS Only   Total Timed Codes (min):  42 1:1 Treatment Time:  42       Treatment Area: Strain of muscle, fascia and tendon of lower back, initial encounter [S39.012A]  Pain in left shoulder [M25.512]    SUBJECTIVE  Pain Level (0-10 scale): 7.5  Any medication changes, allergies to medications, adverse drug reactions, diagnosis change, or new procedure performed?: [x] No    [] Yes (see summary sheet for update)  Subjective functional status/changes:   [] No changes reported  I'm okay more pian in my neck today\"     OBJECTIVE    Modality rationale: decrease edema, decrease inflammation, decrease pain and increase tissue extensibility to improve the patients ability to tolerate ADLs and activities   Min Type Additional Details    [] Estim:  []Unatt       []IFC  []Premod                        []Other:  []w/ice   []w/heat  Position:  Location:    [] Estim: []Att    []TENS instruct  []NMES                    []Other:  []w/US   []w/ice   []w/heat  Position:  Location:    []  Traction: [] Cervical       []Lumbar                       [] Prone          []Supine                       []Intermittent   []Continuous Lbs:  [] before manual  [] after manual    []  Ultrasound: []Continuous   [] Pulsed                           []1MHz   []3MHz W/cm2:  Location:    []  Iontophoresis with dexamethasone         Location: [] Take home patch   [] In clinic   10 []  Ice     [x]  Heat post  []  Ice massage  []  Laser   []  Anodyne Position:reclined  Location:cervial spine UT    []  Laser with stim  []  Other:  Position:  Location:    []  Vasopneumatic Device Pressure:       [] lo [] med [] hi   Temperature: [] lo [] med [] hi   [] Skin assessment post-treatment: []intact []redness- no adverse reaction    []redness  adverse reaction:      min []Eval                  []Re-Eval       32 min Therapeutic Exercise:  [x] See flow sheet :   Rationale: increase ROM and increase strength to improve the patients ability to tolerate ADls and activities    10 min Manual Therapy:  Supine Csp Trx, SOR     Rationale: decrease pain, increase ROM and increase tissue extensibility to tolerate ADLs and activities. With   [x] TE   [] TA   [] neuro   [] other: Patient Education: [x] Review HEP    [] Progressed/Changed HEP based on:   [] positioning   [] body mechanics   [] transfers   [] heat/ice application    [] other:      Other Objective/Functional Measures: VC for exercises and techniques    Pain Level (0-10 scale) post treatment: 5    ASSESSMENT/Changes in Function: Pateint tolerated seesion well today with noticeable tightness in Csp during manual traction. Patient reports benefits with manual traction. At end of session patient reports decreased pain (5). Patient will continue to benefit from skilled PT services to modify and progress therapeutic interventions, address ROM deficits and instruct in home and community integration to attain remaining goals. [x]  See Plan of Care  []  See progress note/recertification  []  See Discharge Summary         Progress towards goals / Updated goals:  1. Patient will be turn FHYJ KLT 21 secs without lasting discomfort. eval 10 secs  2. Patient will be able to walk for 45 min without needing a rest break to increase participation in home activities. Eval 30 min. 3. Patient will decrease pain recovery time by 5 secs (Eval: 10 secs).   4. Patient will demonstrate improvement of Ulnar nerve tension test by 45 degs of rotation. Eval: 0 degs     Long Term Goals: To be accomplished in 8 weeks:  5. Patient will be turn head for 30 secs without lasting discomfort. eval 10 secs  6.  Patient will be able to walk vigorously for 45 min without needing a rest break to increase participation in home activities. Eval 30 min. 7. Patient will decrease pain recovery time by 10 secs (Eval: 10 secs).   8. Patient will demonstrate improvement of Ulnar nerve tension test by 90 degs of rotation, 45 degs extension.  Eval: 0 degs         PLAN  [x]  Upgrade activities as tolerated     [x]  Continue plan of care  []  Update interventions per flow sheet       []  Discharge due to:_  []  Other:_      Manas Pickett 1/30/2020  11:47 AM    Future Appointments   Date Time Provider Jerrica Flanagan   2/4/2020 11:00 AM Gamble Dalton, PTA [facility] [address]   2/6/2020 11:00 AM Rosalba Palacio, PTA [facility] [address]   2/11/2020 11:00 AM Marlan Son, PT [facility] [address]   2/13/2020 11:00 AM Marlan Son, PT [facility] [address]   2/18/2020 11:00 AM Marlan Son, PT [facility] [address]   2/20/2020 11:00 AM Marlan Son, PT [facility] [address]   2/25/2020 11:00 AM Marlan Son, PT [facility] [address]   2/27/2020 11:00 AM Marlan Son, PT [facility] [address]   3/3/2020 11:00 AM Gamble Dalton, PTA [facility] [address]   3/5/2020 11:00 AM Gamble Dalton, PTA [facility] [address]   3/10/2020 11:00 AM Gamble Dalton, PTA [facility] [address]   3/12/2020 11:00 AM Marlan Son, PT [facility] [address]

## 2020-02-04 ENCOUNTER — HOSPITAL ENCOUNTER (OUTPATIENT)
Dept: PHYSICAL THERAPY | Age: 50
Discharge: HOME | End: 2020-02-04
Payer: COMMERCIAL

## 2020-02-04 PROCEDURE — 97140 MANUAL THERAPY 1/> REGIONS: CPT

## 2020-02-04 NOTE — PROGRESS NOTES
PT DAILY TREATMENT NOTE 10-18    Patient Name: Gerda Victor  Date:2020  : 1970  [x]  Patient  Verified  Payor: BLUE CROSS / Plan: Fish 97 / Product Type: ELLIOT /    In time: 11:08  Out time:11:59  Total Treatment Time (min): 47  Visit #: 4 of 16    Medicare/BCBS Only   Total Timed Codes (min):  37 1:1 Treatment Time:  17       Treatment Area: Strain of muscle, fascia and tendon of lower back, initial encounter [S39.012A]  Pain in left shoulder [M25.512]    SUBJECTIVE  Pain Level (0-10 scale): 6  Any medication changes, allergies to medications, adverse drug reactions, diagnosis change, or new procedure performed?: [x] No    [] Yes (see summary sheet for update)  Subjective functional status/changes:   [] No changes reported  \"Still some pain. \"    OBJECTIVE    Modality rationale: decrease edema and decrease inflammation to improve the patients ability to perform ADL    Min Type Additional Details    [] Estim:  []Unatt       []IFC  []Premod                        []Other:  []w/ice   []w/heat  Position:  Location:    [] Estim: []Att    []TENS instruct  []NMES                    []Other:  []w/US   []w/ice   []w/heat  Position:  Location:    []  Traction: [] Cervical       []Lumbar                       [] Prone          []Supine                       []Intermittent   []Continuous Lbs:  [] before manual  [] after manual    []  Ultrasound: []Continuous   [] Pulsed                           []1MHz   []3MHz W/cm2:  Location:    []  Iontophoresis with dexamethasone         Location: [] Take home patch   [] In clinic   10 []  Ice     [x]  Heat post  []  Ice massage  []  Laser   []  Anodyne Position:supine  Location:(B) UT    []  Laser with stim  []  Other:  Position:  Location:    []  Vasopneumatic Device Pressure:       [] lo [] med [] hi   Temperature: [] lo [] med [] hi   [x] Skin assessment post-treatment:  [x]intact []redness- no adverse reaction    []redness  adverse reaction:      min []Eval                  []Re-Eval       25 min Therapeutic Exercise:  [x] See flow sheet :   Rationale: increase ROM, increase strength and improve coordination to improve the patients ability to perform ADL     min Therapeutic Activity:  []  See flow sheet :   Rationale: increase ROM and increase strength  to improve the patients ability to perform ADL       min Neuromuscular Re-education:  []  See flow sheet :   Rationale:   to improve the patients ability to     12 min Manual Therapy:  CSP TX with passive stretch (B) UT   Rationale: decrease pain, increase ROM, increase tissue extensibility and decrease edema  to perform ADL     min Gait Training:  ___ feet with ___ device on level surfaces with ___ level of assist   Rationale: With   [x] TE   [] TA   [] neuro   [] other: Patient Education: [x] Review HEP    [] Progressed/Changed HEP based on:   [] positioning   [] body mechanics   [] transfers   [] heat/ice application    [] other:      Other Objective/Functional Measures:      Pain Level (0-10 scale) post treatment:0    ASSESSMENT/Changes in Function: Pt responded fairly well to each there ex. Benefited with treatment  Today. Discussed with pt on importance of performing HEP 2x day. Patient will continue to benefit from skilled PT services to address functional mobility deficits, address ROM deficits, address strength deficits, analyze and address soft tissue restrictions and analyze and cue movement patterns to attain remaining goals. [x]  See Plan of Care  []  See progress note/recertification  []  See Discharge Summary         Progress towards goals / Updated goals:  1. Patient will be turn MSGJ PIY 82 secs without lasting discomfort. eval 10    Progressing 2/4/20  2. Patient will be able to walk for 45 min without needing a rest break to increase participation in home activities. Eval 30 min.   3. Patient will decrease pain recovery time by 5 secs (Eval: 10 secs).   4. Patient will demonstrate improvement of Ulnar nerve tension test by 45 degs of rotation. Eval: 0 degs     Long Term Goals: To be accomplished in 8 weeks:  5. Patient will be turn head for 30 secs without lasting discomfort. eval 10 secs  6. Patient will be able to walk vigorously for 45 min without needing a rest break to increase participation in home activities. Eval 30 min. 7. Patient will decrease pain recovery time by 10 secs (Eval: 10 secs).   8. Patient will demonstrate improvement of Ulnar nerve tension test by 90 degs of rotation, 45 degs extension.  Eval: 0 degs    PLAN  []  Upgrade activities as tolerated     [x]  Continue plan of care  []  Update interventions per flow sheet       []  Discharge due to:_  []  Other:_      Rosalba Palacio PTA 2/4/2020  11:26 AM    Future Appointments   Date Time Provider Jerrica Flanagan   2/6/2020 11:00 AM Nikki Salazar PTA [facility]   2/11/2020 11:00 AM Jcarlos Gavel, PT [facility]   2/13/2020 11:00 AM Jcarlos Gavel, PT [facility]   2/18/2020 11:00 AM Jcarlos Gavel, PT [facility]   2/20/2020 11:00 AM Jcarlos Gavel, PT [facility]   2/25/2020 11:00 AM Jcarlos Gavel, PT [facility]   2/27/2020 11:00 AM Jcarlos Gavel, PT [facility]   3/3/2020 11:00 AM Nikki Salazar, PTA [facility]   3/5/2020 11:00 AM Nikki Salazar, PTA [facility]   3/10/2020 11:00 AM Nikki Salazar, PTA [facility]   3/12/2020 11:00 AM Jcarlos Gavel, PT [facility]

## 2020-02-06 ENCOUNTER — HOSPITAL ENCOUNTER (OUTPATIENT)
Dept: PHYSICAL THERAPY | Age: 50
Discharge: HOME | End: 2020-02-06
Payer: COMMERCIAL

## 2020-02-06 PROCEDURE — 97110 THERAPEUTIC EXERCISES: CPT

## 2020-02-06 PROCEDURE — 97140 MANUAL THERAPY 1/> REGIONS: CPT

## 2020-02-06 NOTE — PROGRESS NOTES
PT DAILY TREATMENT NOTE 10-18    Patient Name: Clara Wells  Date:2020  : 1970  [x]  Patient  Verified  Payor: Alyssa Short / Plan: Serafin Ruggiero / Product Type: HMO /    In time:11:10   Out time: 12:10  Total Treatment Time (min): 45  Visit #: 5 of 16    Medicare/BCBS Only   Total Timed Codes (min):  35 1:1 Treatment Time:  23       Treatment Area: Strain of muscle, fascia and tendon of lower back, initial encounter [S39.012A]  Pain in left shoulder [M25.512]    SUBJECTIVE  Pain Level (0-10 scale): 6  Any medication changes, allergies to medications, adverse drug reactions, diagnosis change, or new procedure performed?: [x] No    [] Yes (see summary sheet for update)  Subjective functional status/changes:   [] No changes reported  \"Pain is not as tense. \"    OBJECTIVE    Modality rationale: decrease edema, decrease inflammation, decrease pain and increase tissue extensibility to improve the patients ability to perform ADL   Min Type Additional Details    [] Estim:  []Unatt       []IFC  []Premod                        []Other:  []w/ice   []w/heat  Position:  Location:    [] Estim: []Att    []TENS instruct  []NMES                    []Other:  []w/US   []w/ice   []w/heat  Position:  Location:    []  Traction: [] Cervical       []Lumbar                       [] Prone          []Supine                       []Intermittent   []Continuous Lbs:  [] before manual  [] after manual    []  Ultrasound: []Continuous   [] Pulsed                           []1MHz   []3MHz W/cm2:  Location:    []  Iontophoresis with dexamethasone         Location: [] Take home patch   [] In clinic   10 []  Ice     [x]  heat   post  []  Ice massage  []  Laser   []  Anodyne Position:supine  Location:(B) UT    []  Laser with stim  []  Other:  Position:  Location:    []  Vasopneumatic Device Pressure:       [] lo [] med [] hi   Temperature: [] lo [] med [] hi   [x] Skin assessment post-treatment:  [x]intact []redness- no adverse reaction    []redness  adverse reaction:      min []Eval                  []Re-Eval       23 min Therapeutic Exercise:  [x] See flow sheet :   Rationale: increase ROM and increase strength to improve the patients ability to perform ADL      min Therapeutic Activity:  []  See flow sheet :   Rationale: increase ROM and increase strength  to improve the patients ability to perform ADL       min Neuromuscular Re-education:  []  See flow sheet :   Rationale:   to improve the patients ability to     12 min Manual Therapy:  CSP TX with passive stretch (B) UT   Rationale: decrease pain, increase ROM, increase tissue extensibility and decrease edema  to perform ADL     min Gait Training:  ___ feet with ___ device on level surfaces with ___ level of assist   Rationale: With   [x] TE   [] TA   [] neuro   [] other: Patient Education: [x] Review HEP    [] Progressed/Changed HEP based on:   [] positioning   [] body mechanics   [] transfers   [] heat/ice application    [] other:      Other Objective/Functional Measures:      Pain Level (0-10 scale) post treatment: 0    ASSESSMENT/Changes in Function: Pt demo tightness (B) UT with CSP stretch (B) UT. Responded fairly well to each there ex. Patient will continue to benefit from skilled PT services to address functional mobility deficits, address ROM deficits, address strength deficits, analyze and address soft tissue restrictions and analyze and cue movement patterns to attain remaining goals. [x]  See Plan of Care  []  See progress note/recertification  []  See Discharge Summary         Progress towards goals / Updated goals:  1. Patient will be turn TUFI TESS 44 secs without lasting discomfort. eval 10    Progressing 2/4/20  2. Patient will be able to walk for 45 min without needing a rest break to increase participation in home activities. Eval 30 min. Current: Assess NV  3.  Patient will decrease pain recovery time by 5 secs (Eval: 10 secs).   4. Patient will demonstrate improvement of Ulnar nerve tension test by 45 degs of rotation. Eval: 0 degs     Long Term Goals: To be accomplished in 8 weeks:  5. Patient will be turn head for 30 secs without lasting discomfort. eval 10 secs  6. Patient will be able to walk vigorously for 45 min without needing a rest break to increase participation in home activities. Eval 30 min. 7. Patient will decrease pain recovery time by 10 secs (Eval: 10 secs).   8. Patient will demonstrate improvement of Ulnar nerve tension test by 90 degs of rotation, 45 degs extension.  Eval: 0 degs    PLAN  []  Upgrade activities as tolerated     []  Continue plan of care  []  Update interventions per flow sheet       []  Discharge due to:_  [x]  Other:_  REASSESS Ambulation on TM /ROM NV    Rosalba Palacio, PTA 2/6/2020  12:02 PM    Future Appointments   Date Time Provider Jerrica Flanagan   2/11/2020 11:00 AM Shay Wellington, PT [facility]   2/13/2020 11:00 AM Shay Wellington, PT [facility]   2/18/2020 11:00 AM Shay Wellington, PT [facility]   2/20/2020 11:00 AM Shay Wellington, PT [facility]   2/25/2020 11:00 AM Shay Wellington, PT [facility]   2/27/2020 11:00 AM Shay Wellington, PT [facility]   3/3/2020 11:00 AM Keyonna Latus, PTA [facility]   3/5/2020 11:00 AM Keyonna Latus, PTA [facility]   3/10/2020 11:00 AM Keyonna Latus, PTA [facility]   3/12/2020 11:00 AM Shay Eliz, PT [facility]

## 2020-02-11 ENCOUNTER — HOSPITAL ENCOUNTER (OUTPATIENT)
Dept: PHYSICAL THERAPY | Age: 50
Discharge: HOME | End: 2020-02-11
Payer: COMMERCIAL

## 2020-02-11 PROCEDURE — 97140 MANUAL THERAPY 1/> REGIONS: CPT

## 2020-02-11 PROCEDURE — 97530 THERAPEUTIC ACTIVITIES: CPT

## 2020-02-11 PROCEDURE — 97110 THERAPEUTIC EXERCISES: CPT

## 2020-02-11 NOTE — PROGRESS NOTES
PT DAILY TREATMENT NOTE 10-18    Patient Name: Ro Marquez  Date:2020  : 1970  [x]  Patient  Verified  Payor: Ozzie Hart / Plan: Mariah Singh / Product Type: HMO /    In time: Out time:1209  Total Treatment Time (min): 58  Visit #:6 of 16    Medicare/BCBS Only   Total Timed Codes (min):  48 1:1 Treatment Time:  48       Treatment Area: Strain of muscle, fascia and tendon of lower back, initial encounter [S39.012A]  Pain in left shoulder [M25.512]    SUBJECTIVE  Pain Level (0-10 scale): 5  Any medication changes, allergies to medications, adverse drug reactions, diagnosis change, or new procedure performed?: [x] No    [] Yes (see summary sheet for update)  Subjective functional status/changes:   [] No changes reported  \"It's going good today, just a little pain\"    OBJECTIVE    Modality rationale: decrease pain and increase tissue extensibility to improve the patients ability to tolerate ADLs and activities   Min Type Additional Details    [] Estim:  []Unatt       []IFC  []Premod                        []Other:  []w/ice   []w/heat  Position:  Location:    [] Estim: []Att    []TENS instruct  []NMES                    []Other:  []w/US   []w/ice   []w/heat  Position:  Location:    []  Traction: [] Cervical       []Lumbar                       [] Prone          []Supine                       []Intermittent   []Continuous Lbs:  [] before manual  [] after manual    []  Ultrasound: []Continuous   [] Pulsed                           []1MHz   []3MHz W/cm2:  Location:    []  Iontophoresis with dexamethasone         Location: [] Take home patch   [] In clinic   10 []  Ice     [x]  Heat post  []  Ice massage  []  Laser   []  Anodyne Position:supine  Location:Csp    []  Laser with stim  []  Other:  Position:  Location:    []  Vasopneumatic Device Pressure:       [] lo [] med [] hi   Temperature: [] lo [] med [] hi   [] Skin assessment post-treatment:  []intact []redness- no adverse reaction []redness  adverse reaction:       23 min Therapeutic Exercise:  [x] See flow sheet :   Rationale: increase ROM and increase strength to improve the patients ability to tolerate ADLs and activities    15 min Therapeutic Activity:  [x]  See flow sheet :   Rationale: increase ROM and increase strength  to improve the patients ability to tolerate ADLs and activities. 10 min Manual Therapy:  Csp Tx, SOR   Rationale: decrease pain, increase ROM and increase tissue extensibility to tolerate ADLs and activities        With   [x] TE   [x] TA   [] neuro   [] other: Patient Education: [x] Review HEP    [] Progressed/Changed HEP based on:   [] positioning   [] body mechanics   [] transfers   [] heat/ice application    [] other:      Other Objective/Functional Measures: VC for exercises and techniques    Pain Level (0-10 scale) post treatment: much better    ASSESSMENT/Changes in Function: Progressing towards goals, tolerated session well today. Patient reports improvement with looking over shoulder when driving. Reports Csp traction really helps and experiences up to 5 hours of relief from it. Noted minimal tightness in Csp during manual.    Patient will continue to benefit from skilled PT services to modify and progress therapeutic interventions, address functional mobility deficits, address ROM deficits, address strength deficits and instruct in home and community integration to attain remaining goals. [x]  See Plan of Care  []  See progress note/recertification  []  See Discharge Summary         Progress towards goals / Updated goals:  1. Patient Nova Bugler secs without lasting discomfort. eval 10     CURRENT:  Progressing 2/4/20, 2/11/20  2. Patient will be able to walk for 45 min without needing a rest break to increase participation in home activities. Eval 30 min. Current: Assess NV, 25 min 2/11/2020  3.  Patient will decrease pain recovery time by 5 secs (Eval: 10 secs).   4. Patient will demonstrate improvement of Ulnar nerve tension test by 45 degs of rotation. Eval: 0 degs     Long Term Goals: To be accomplished in 8 weeks:  5. Patient will be turn head for 30 secs without lasting discomfort. eval 10 secs  6. Patient will be able to walk vigorously for 45 min without needing a rest break to increase participation in home activities. Eval 30 min. 7. Patient will decrease pain recovery time by 10 secs (Eval: 10 secs).   8. Patient will demonstrate improvement of Ulnar nerve tension test by 90 degs of rotation, 45 degs extension.  Eval: 0      PLAN  [x]  Upgrade activities as tolerated     [x]  Continue plan of care  []  Update interventions per flow sheet       []  Discharge due to:_  []  Other:_      Colby Ren 2/11/2020  11:30 AM    Future Appointments   Date Time Provider Jerrica Flanagan   2/13/2020 11:00 AM Delbra Cart, PT [facility]   2/18/2020 11:00 AM Delbra Cart, PT [facility]   2/20/2020 11:00 AM Delbra Cart, PT [facility]   2/25/2020 11:00 AM Delbra Cart, PT [facility]   2/27/2020 11:00 AM Delbra Cart, PT [facility]   3/3/2020 11:00 AM Gricel Chuy, PTA [facility]   3/5/2020 11:00 AM Gricel Chuy, PTA [facility]   3/10/2020 11:00 AM Gricel Chuy, PTA [facility]   3/12/2020 11:00 AM Delbra Cart, [facility]

## 2020-02-13 ENCOUNTER — HOSPITAL ENCOUNTER (OUTPATIENT)
Dept: PHYSICAL THERAPY | Age: 50
End: 2020-02-13
Payer: COMMERCIAL
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
## 2020-03-03 ENCOUNTER — APPOINTMENT (OUTPATIENT)
Dept: PHYSICAL THERAPY | Age: 50
End: 2020-03-03

## 2020-03-05 ENCOUNTER — APPOINTMENT (OUTPATIENT)
Dept: PHYSICAL THERAPY | Age: 50
End: 2020-03-05

## 2020-03-10 ENCOUNTER — APPOINTMENT (OUTPATIENT)
Dept: PHYSICAL THERAPY | Age: 50
End: 2020-03-10

## 2020-03-12 ENCOUNTER — APPOINTMENT (OUTPATIENT)
Dept: PHYSICAL THERAPY | Age: 50
End: 2020-03-12

## 2020-09-01 ENCOUNTER — HOSPITAL ENCOUNTER (OUTPATIENT)
Dept: LAB | Age: 50
Discharge: HOME | End: 2020-09-01

## 2020-09-01 LAB — XX-LABCORP SPECIMEN COL,LCBCF: NORMAL

## 2020-09-01 PROCEDURE — 99001 SPECIMEN HANDLING PT-LAB: CPT

## 2021-10-22 ENCOUNTER — HOSPITAL ENCOUNTER (OUTPATIENT)
Dept: GENERAL RADIOLOGY | Age: 51
Discharge: HOME | End: 2021-10-22
Payer: COMMERCIAL

## 2021-10-22 ENCOUNTER — HOSPITAL ENCOUNTER (OUTPATIENT)
Dept: LAB | Age: 51
Discharge: HOME | End: 2021-10-22
Payer: COMMERCIAL

## 2021-10-22 DIAGNOSIS — M25.532 LEFT WRIST PAIN: ICD-10-CM

## 2021-10-22 LAB — XX-LABCORP SPECIMEN COL,LCBCF: NORMAL

## 2021-10-22 PROCEDURE — 99001 SPECIMEN HANDLING PT-LAB: CPT

## 2021-10-22 PROCEDURE — 73110 X-RAY EXAM OF WRIST: CPT

## 2021-12-23 ENCOUNTER — OFFICE VISIT (OUTPATIENT)
Dept: ORTHOPEDIC SURGERY | Age: 51
End: 2021-12-23
Payer: COMMERCIAL

## 2021-12-23 VITALS
HEART RATE: 84 BPM | BODY MASS INDEX: 39.48 KG/M2 | HEIGHT: 71 IN | RESPIRATION RATE: 15 BRPM | OXYGEN SATURATION: 98 % | WEIGHT: 282 LBS

## 2021-12-23 DIAGNOSIS — M65.4 DE QUERVAIN'S TENOSYNOVITIS, BILATERAL: ICD-10-CM

## 2021-12-23 DIAGNOSIS — M77.8 TENDINITIS OF BOTH WRISTS: Primary | ICD-10-CM

## 2021-12-23 PROCEDURE — 99204 OFFICE O/P NEW MOD 45 MIN: CPT | Performed by: FAMILY MEDICINE

## 2021-12-23 RX ORDER — IBUPROFEN 800 MG/1
TABLET ORAL
COMMUNITY
Start: 2021-11-30

## 2021-12-23 RX ORDER — DICLOFENAC SODIUM 10 MG/G
2 GEL TOPICAL
Qty: 300 G | Refills: 2 | Status: SHIPPED | OUTPATIENT
Start: 2021-12-23 | End: 2022-09-20 | Stop reason: SDUPTHER

## 2021-12-23 RX ORDER — LISINOPRIL 10 MG/1
TABLET ORAL
COMMUNITY

## 2021-12-23 NOTE — PATIENT INSTRUCTIONS
[facility] Disease: Exercises  Introduction  Here are some examples of exercises for you to try. The exercises may be suggested for a condition or for rehabilitation. Start each exercise slowly. Ease off the exercises if you start to have pain. You will be told when to start these exercises and which ones will work best for you. How to do the exercises  Wrist extensor stretch    1. Extend the arm with the affected wrist in front of you and point your fingers toward the floor. 2. With your other hand, gently bend your wrist farther until you feel a mild to moderate stretch in your forearm. 3. Hold the stretch for at least 15 to 30 seconds. 4. Repeat 2 to 4 times. 5. When you can do this stretch with ease and no pain, repeat steps 1 through 4. But this time extend your affected arm in front of you and make a fist with your palm facing down. Then bend your wrist, pointing your fist toward the floor. Wrist flexor stretch    1. Extend the arm with the affected wrist in front of you with your palm facing away from your body. 2. Bend back your wrist, pointing your hand up toward the ceiling. 3. With your other hand, gently bend your wrist farther until you feel a mild to moderate stretch in your forearm. 4. Hold the stretch for at least 15 to 30 seconds. 5. Repeat 2 to 4 times. 6. Repeat steps 1 through 5, but this time extend your affected arm in front of you with your palm facing up. Then bend back your wrist, pointing your hand toward the floor. Thumb lifts    1. Place your hand on a flat surface, with your palm up. 2. Lift your thumb away from your palm to make a \"C\" shape. 3. Hold for about 6 seconds. 4. Repeat 8 to 12 times. Passive thumb MP flexion    1. Hold your hand in front of you, and turn your hand so your little finger faces down and your thumb faces up. (Your hand should be in the position used for shaking someone's hand.) You may also rest your hand on a flat surface.   2. Use the fingers on your other hand to bend your thumb down at the point where your thumb connects to your palm. 3. Hold for at least 15 to 30 seconds. 4. Repeat 2 to 4 times. Finkelstein stretch    1. Hold your arms out in front of you. (Your hand should be in the position used for shaking someone's hand.)  2. Bend your thumb toward your palm. 3. Use your other hand to gently stretch your thumb and wrist downward until you feel the stretch on the thumb side of your wrist.  4. Hold for at least 15 to 30 seconds. 5. Repeat 2 to 4 times. Resisted ulnar deviation    For this exercise, you will need elastic exercise material, such as surgical tubing or Thera-Band. 1. Sit leaning forward with your legs slightly spread and your elbow on your thigh. 2. Grasp one end of the band with your palm down, and step on the other end with the foot opposite the hand holding the band. 3. Slowly bend your wrist sideways and away from your knee. 4. Repeat 8 to 12 times. Follow-up care is a key part of your treatment and safety. Be sure to make and go to all appointments, and call your doctor if you are having problems. It's also a good idea to know your test results and keep a list of the medicines you take. Where can you learn more? Go to http://www.gray.com/  Enter G571 in the search box to learn more about \"De Quervain's Disease: Exercises. \"  Current as of: July 1, 2021               Content Version: 13.0  © 2006-2021 Healthwise, Incorporated. Care instructions adapted under license by [employer] (which disclaims liability or warranty for this information). If you have questions about a medical condition or this instruction, always ask your healthcare professional. [place] [address] any warranty or liability for your use of this information.

## 2021-12-23 NOTE — PROGRESS NOTES
HISTORY OF PRESENT ILLNESS    Glen Price 1970 is a 46y.o. year old male comes in today as new patient for: wrist pain L.R    Patients symptoms have been present for 3 or so months. Pain level 9/10 dorsal/lateral. It has worsened with bed at night. Patient has tried:  voltaren gel with benefit. 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 IMAGING: XR left wrist 10/22/2021 images reviewed and I concur with:  IMPRESSION  1. No acute osseous findings. History reviewed. No pertinent surgical history. Social History     Socioeconomic History    Marital status: SINGLE   Tobacco Use    Smoking status: Never Smoker    Smokeless tobacco: Never Used   Vaping Use    Vaping Use: Never used   Substance and Sexual Activity    Alcohol use: Not Currently    Drug use: Never      Current Outpatient Medications   Medication Sig Dispense Refill    lisinopriL (PRINIVIL, ZESTRIL) 10 mg tablet lisinopril 10 mg tablet      ibuprofen (MOTRIN) 800 mg tablet       valACYclovir (VALTREX) 1 gram tablet Take  by mouth.  simvastatin (ZOCOR) 40 mg tablet Take  by mouth nightly.  cholecalciferol, vitamin D3, (VITAMIN D3) 2,000 unit tab Take  by mouth.  lansoprazole (PREVACID) 15 mg capsule Take  by mouth Daily (before breakfast).  montelukast (SINGULAIR) 10 mg tablet Take 10 mg by mouth daily.  (Patient not taking: Reported on 12/23/2021)       Past Medical History:   Diagnosis Date    Acute atopic conjunctivitis     Acute upper respiratory infections of unspecified site     Chronic cardiac arrhythmia     Elevated PSA     Herpes simplex infection     type 2    HTN (hypertension)     Morbid obesity ([address]     Pure hypercholesterolemia     Umbilical hernia     Unspecified sleep apnea     Unspecified vitamin D deficiency      Family History   Problem Relation Age of Onset    Hypertension Mother     Diabetes Mother     Cancer Maternal Uncle         Prostate Cancer         ROS:  No swell, bruise, some numb left arm sometime when sleeping      Objective:  Pulse 84   Resp 15   Ht 5' 11\" (1.803 m)   Wt 282 lb (127.9 kg)   SpO2 98%   BMI 39.33 kg/m²   NEURO:  Sensation intact light touch upper and lower extremities. Biceps & Triceps reflexes +2/4 bilaterally. left hand dominant. Spurling Positive bilateral   M/S:  bilateral  elbow/wrist: Positive TTP dorsal/volar tendons, worse with resisted use. Negative tommie tenderness. Phalen's negative. Tinel's negative. Strength +5/5 bilateral .  Piano key sign Negative right. Carpal bone motion normal.  Finklestein's positive bilateral  TFCC Load Test negative. BILATERAL bilateral epicondyle(s) with mild TTP worsened with wrist flexion/extension. negative muscular atrophy. Assessment/Plan:     ICD-10-CM ICD-9-CM    1. Tendinitis of both wrists  M77.8 727.05 AMB SUPPLY ORDER      diclofenac (VOLTAREN) 1 % gel   2. De Quervain's tenosynovitis, bilateral  M65.4 727.04 AMB SUPPLY ORDER      diclofenac (VOLTAREN) 1 % gel       Patient (or guardian if minor) verbalizes understanding of evaluation and plan. Will start HEP and Rx for voltaren gel w/ wrist braces as above and plan follow-up 4 weeks.

## 2021-12-23 NOTE — LETTER
NOTIFICATION RETURN TO WORK / SCHOOL    12/23/2021 9:29 AM    Mr. Garlan Epley  [phone] [id number]      To Whom It May Concern:    Garlan Epley is currently under the care of [facility] [address] He will return to work/school on: 12/26/2021. If there are questions or concerns please have the patient contact our office.         Sincerely,      Yuli Subramanian, DO

## 2022-02-03 ENCOUNTER — OFFICE VISIT (OUTPATIENT)
Dept: ORTHOPEDIC SURGERY | Age: 52
End: 2022-02-03
Payer: COMMERCIAL

## 2022-02-03 ENCOUNTER — HOSPITAL ENCOUNTER (OUTPATIENT)
Dept: OCCUPATIONAL MEDICINE | Age: 52
Discharge: HOME | End: 2022-02-03
Attending: FAMILY MEDICINE

## 2022-02-03 VITALS
WEIGHT: 279 LBS | HEIGHT: 71 IN | HEART RATE: 90 BPM | OXYGEN SATURATION: 98 % | RESPIRATION RATE: 15 BRPM | BODY MASS INDEX: 39.06 KG/M2

## 2022-02-03 DIAGNOSIS — M65.4 DE QUERVAIN'S TENOSYNOVITIS, BILATERAL: ICD-10-CM

## 2022-02-03 DIAGNOSIS — M77.8 TENDINITIS OF BOTH WRISTS: ICD-10-CM

## 2022-02-03 DIAGNOSIS — M65.4 DE QUERVAIN'S TENOSYNOVITIS, BILATERAL: Primary | ICD-10-CM

## 2022-02-03 PROCEDURE — 99213 OFFICE O/P EST LOW 20 MIN: CPT | Performed by: FAMILY MEDICINE

## 2022-02-03 PROCEDURE — 76942 ECHO GUIDE FOR BIOPSY: CPT | Performed by: FAMILY MEDICINE

## 2022-02-03 PROCEDURE — 20550 NJX 1 TENDON SHEATH/LIGAMENT: CPT | Performed by: FAMILY MEDICINE

## 2022-02-03 RX ORDER — METHYLPREDNISOLONE ACETATE 40 MG/ML
40 INJECTION, SUSPENSION INTRA-ARTICULAR; INTRALESIONAL; INTRAMUSCULAR; SOFT TISSUE ONCE
Status: COMPLETED | OUTPATIENT
Start: 2022-02-03 | End: 2022-02-03

## 2022-02-03 RX ADMIN — METHYLPREDNISOLONE ACETATE 40 MG: 40 INJECTION, SUSPENSION INTRA-ARTICULAR; INTRALESIONAL; INTRAMUSCULAR; SOFT TISSUE at 11:11

## 2022-02-03 NOTE — PROGRESS NOTES
HISTORY OF PRESENT ILLNESS    Warden Singh 1970 is a 46y.o. year old male comes in today to be evaluated and treated for: wrist/thumb pain B/L    Since last appt has noticed minimal improvement with braces. Pain level 9/10. Using voltaren gel 3/day with benefit. Much worse end of day or bumping it. Worst pain thumb side wrists right>left. IMAGING: XR left wrist 10/22/2021 images reviewed and I concur with:  IMPRESSION  1. No acute osseous findings. History reviewed. No pertinent surgical history. Social History     Socioeconomic History    Marital status: SINGLE   Tobacco Use    Smoking status: Never Smoker    Smokeless tobacco: Never Used   Vaping Use    Vaping Use: Never used   Substance and Sexual Activity    Alcohol use: Not Currently    Drug use: Never     Current Outpatient Medications   Medication Sig Dispense Refill    lisinopriL (PRINIVIL, ZESTRIL) 10 mg tablet lisinopril 10 mg tablet      ibuprofen (MOTRIN) 800 mg tablet       diclofenac (VOLTAREN) 1 % gel Apply 2 g to affected area every six (6) hours as needed for Pain (both wrists). 300 g 2    valACYclovir (VALTREX) 1 gram tablet Take  by mouth.  simvastatin (ZOCOR) 40 mg tablet Take  by mouth nightly.  cholecalciferol, vitamin D3, (VITAMIN D3) 2,000 unit tab Take  by mouth.  lansoprazole (PREVACID) 15 mg capsule Take  by mouth Daily (before breakfast).  montelukast (SINGULAIR) 10 mg tablet Take 10 mg by mouth daily.  (Patient not taking: Reported on 12/23/2021)       Past Medical History:   Diagnosis Date    Acute atopic conjunctivitis     Acute upper respiratory infections of unspecified site     Chronic cardiac arrhythmia     Elevated PSA     Herpes simplex infection     type 2    HTN (hypertension)     Morbid obesity ([facility] [address]     Pure hypercholesterolemia     Umbilical hernia     Unspecified sleep apnea     Unspecified vitamin D deficiency      Family History   Problem Relation Age of Onset    Hypertension Mother     Diabetes Mother     Cancer Maternal Uncle         Prostate Cancer       ROS:  + swell, + numbness arms when sleeping    Objective:  Pulse 90   Resp 15   Ht 5' 11\" (1.803 m)   Wt 279 lb (126.6 kg)   SpO2 98%   BMI 38.91 kg/m²   NEURO:  Sensation intact light touch upper and lower extremities. Biceps & Triceps reflexes +2/4 bilaterally. left hand dominant. Spurling Positive bilateral   M/S:  bilateral  elbow/wrist: Positive TTP dorsal/volar tendons, worse with resisted use. Negative tommie tenderness. Phalen's negative. Tinel's negative. Strength +5/5 bilateral .  Piano key sign Negative right. Carpal bone motion normal.  Finklestein's positive bilateral  TFCC Load Test negative. BILATERAL bilateral epicondyle(s) with mild TTP worsened with wrist flexion/extension. negative muscular atrophy. Assessment/Plan:     ICD-10-CM ICD-9-CM    1. De Quervain's tenosynovitis, bilateral  M65.4 727.04 XR WRIST RT AP/LAT/OBL MIN 3V      INJECT TENDON SHEATH/LIGAMENT      AMB POC US, SONO GUIDE NEEDLE   2. Tendinitis of both wrists  M77.8 727.05 XR WRIST RT AP/LAT/OBL MIN 3V         Patient (or guardian if minor) verbalizes understanding of evaluation and plan. Will continue HEP as above w/ braces and voltaren gel from prior and inject right side de Quevain and plan follow-up 3 weeks.

## 2022-02-03 NOTE — PROCEDURES
PROCEDURE NOTE:  Time out: 1038am  * Patient was identified by name and date of birth   * Agreement on procedure being performed was verified  * Risks and Benefits explained to the patient  * Procedure site verified and marked as necessary  * Patient was positioned for comfort  * Consent was signed and verified. Risks/benefits including but not limited to bleeding, infection, and scarring discussed and Pt wishes to proceed with procedure. The area was prepped with betadine. Under sterile technique with ethyl chloride spray and with ultrasound guidance using [employer] uSmart 3200T with 4-15 MHz linear transducer, indication for ultrasound guidance habitus   1cc of 40mg/cc methylprednisolone acetate and 1cc lidocaine were injected into 1st dorsal compartment right wrist tendon sheath using distal approach with radial styloid as a giude. Sterile gauze used to clean the area. Blood loss minimal.  Noticed improvement in pain Sx within 5 minutes (now rated 1/10). Tolerated procedure well. Discussed possible signs/Sx of infxn, and advised to seek care if concerned. Ultrasound images (if applicable) digitally attached to CPT order in patients chart.

## 2022-02-03 NOTE — LETTER
Name:  Jesus Thomas   :  1970  MR#:  [id number]   PROCEDURAL INFORMED CONSENT FOR OPERATION / PROCEDURE   1. I (we), _____Po Suh__________ authorize Brian Pacheco DO, FAOASM                       (Patient Name)     (Provider / Jason Zamorano)   and/or such assistants as may be selected by him/her, to perform the following operation/procedures   _______________inject steroid into left thumb de Quervain's_____________________  _________________________________________________________________________  Note: If unable to obtain consent prior to an emergent procedure, document the emergent reason in the medical record. This procedure has been explained to my (our) satisfaction and included in the explanation was:   A) the intended benefit, nature, and extent of the procedure to be performed;   B) the significant risks involved and the probability of success;   C) alternative procedures and methods of treatment;   D) the dangers and probable consequences of such alternatives (including no procedure or treatment); E) the expected consequences of the procedure on my future health;   F) whether other qualified individuals would be performing important surgical tasks and / or whether  would be present to advise or support the procedure. I (we) understand that there are other risks of infection and other serious complications in the pre-operative/procedural and postoperative/procedural stages of my (our) care. I (we) have asked all of the questions which I (we) thought were important in deciding whether or not to undergo treatment or diagnosis. These questions have been answered to my (our) satisfaction. I (we) understand that no assurance can be given that the procedure will be a success, and no guarantee or warranty of success has been given to me (us).    2. It has been explained to me (us) that during the course of the operation/procedure, unforeseen conditions may be revealed that necessitate extension of the original procedure(s) or different procedure(s) than those set forth in Paragraph 1. I (we) authorize and request that the above-named physician, his/her assistants or his/her designees, perform procedures as necessary and desirable if deemed to be in my (our) best interest.     3. I acknowledge that other health care personnel may be observing this procedure for the purpose of medical education or other specified purposes as may be necessary as requested and/or approved by my (our) physician. 4. I (we) consent to the disposal by the hospital Pathologist of the removed tissue, parts or organs in accordance with hospital policy. Page 1 of 2          Name:  Teetee Rouse         :  1970         MR#:  [id number]      4. I do_x_ do not______ consent to the use of a local infiltration pain blocking agent that will be used by my provider/surgical provider to help alleviate pain during my procedure. 6. I do_____ do not_x_ consent to an emergent blood transfusion in the case of a life-threatening situation that requires blood components to be administered. This consent is valid for 24 hours from the beginning of the procedure. 7. This patient does _____ or does not _x__currently have a DNR status/order. If DNR order is in place, obtain \"Addendum to the Surgical Consent for ALL Patients with a DNR Order\" to address sonia-operative status for limited intervention or DNR suspension. 8. I have read and fully understand the above Consent for Operation/Procedure and that all blanks were completed before I signed the consent.    X____________________________________ _____oP TAYLOR Glaude_________   Date: 2/3/2022/_______am/pm   Signature of Patient or legal representative.    _____________________________ ____Cristina Gotti, ATC____Nancy Paul, LPN_____   Date: /_______ am/pm   Witness to Signature Printed Name Date / Time    (If patient is unable to sign or is a minor, complete the following)     Patient is a minor, ____years of age, or unable to sign because:   _________________________________________________________________________  Robertadeline Rummansi If a phone consent is obtained, consent will be documented by using two health care professionals, each affirming that the consenting party has no questions and gives consent for the procedure discussed with the physician/provider.     _________________________________ _______________________________   Date: ______/_____am/pm   2nd witness to phone consent Printed name Date / Time   Informed consent:   I have provided the explanation described above in section 1 to the patient and/or legal representative. I have provided the patient and/or legal representative with an opportunity to ask any questions about the proposed operation/procedure. _                       _ __CITLALI Mckenzie____ 2/3/2022/_______ am/pm   Provider / Proceduralist Printed Name Date / Time   This Provider / Proceduralist performing the surgery is ONLY for Office-based procedures in [place]   [x] Board certified or Board eligible by one of the [employer], the [employer], the [employer] of Podiatric Medicine, the [employer] of Foot and Ankle Surgery, or other board as approved by the hospital for medical staff appointment.    Revised 8/2/2021 Page 2 of 2

## 2022-02-03 NOTE — LETTER
2/3/2022    Patient: Danny Rodriges   YOB: 1970   Date of Visit: 2/3/2022     Francisco Delatorre MD  [address] [id number]  Via Outside Provider Messaging    Dear Francisco Delatorre MD,      Thank you for referring Mr. Ole Branham to [facility] for evaluation. My notes for this consultation are attached. If you have questions, please do not hesitate to call me. I look forward to following your patient along with you.       Sincerely,    Melecio Alvarado, DO

## 2022-02-22 ENCOUNTER — OFFICE VISIT (OUTPATIENT)
Dept: ORTHOPEDIC SURGERY | Age: 52
End: 2022-02-22
Payer: COMMERCIAL

## 2022-02-22 VITALS
HEART RATE: 86 BPM | OXYGEN SATURATION: 98 % | HEIGHT: 71 IN | WEIGHT: 273 LBS | BODY MASS INDEX: 38.22 KG/M2 | RESPIRATION RATE: 15 BRPM

## 2022-02-22 DIAGNOSIS — M65.4 DE QUERVAIN'S TENOSYNOVITIS, BILATERAL: Primary | ICD-10-CM

## 2022-02-22 DIAGNOSIS — M77.8 TENDINITIS OF BOTH WRISTS: ICD-10-CM

## 2022-02-22 PROCEDURE — 20550 NJX 1 TENDON SHEATH/LIGAMENT: CPT | Performed by: FAMILY MEDICINE

## 2022-02-22 PROCEDURE — 76942 ECHO GUIDE FOR BIOPSY: CPT | Performed by: FAMILY MEDICINE

## 2022-02-22 RX ORDER — METHYLPREDNISOLONE ACETATE 40 MG/ML
40 INJECTION, SUSPENSION INTRA-ARTICULAR; INTRALESIONAL; INTRAMUSCULAR; SOFT TISSUE ONCE
Status: COMPLETED | OUTPATIENT
Start: 2022-02-22 | End: 2022-02-22

## 2022-02-22 RX ADMIN — METHYLPREDNISOLONE ACETATE 40 MG: 40 INJECTION, SUSPENSION INTRA-ARTICULAR; INTRALESIONAL; INTRAMUSCULAR; SOFT TISSUE at 14:33

## 2022-02-22 NOTE — LETTER
Name:  Chapito Nielsen   :  1970  MR#:  [id number]   PROCEDURAL INFORMED CONSENT FOR OPERATION / PROCEDURE   1. I (we), _____Po Suh__________ authorize Hardeep Tony DO, FAOASM                       (Patient Name)     (Provider / Royer Will)   and/or such assistants as may be selected by him/her, to perform the following operation/procedures   _______________inject steroid into left wrist for de Quervain's Tenosynovitis______________  _________________________________________________________________________  Note: If unable to obtain consent prior to an emergent procedure, document the emergent reason in the medical record. This procedure has been explained to my (our) satisfaction and included in the explanation was:   A) the intended benefit, nature, and extent of the procedure to be performed;   B) the significant risks involved and the probability of success;   C) alternative procedures and methods of treatment;   D) the dangers and probable consequences of such alternatives (including no procedure or treatment); E) the expected consequences of the procedure on my future health;   F) whether other qualified individuals would be performing important surgical tasks and / or whether  would be present to advise or support the procedure. I (we) understand that there are other risks of infection and other serious complications in the pre-operative/procedural and postoperative/procedural stages of my (our) care. I (we) have asked all of the questions which I (we) thought were important in deciding whether or not to undergo treatment or diagnosis. These questions have been answered to my (our) satisfaction. I (we) understand that no assurance can be given that the procedure will be a success, and no guarantee or warranty of success has been given to me (us).    2. It has been explained to me (us) that during the course of the operation/procedure, unforeseen conditions may be revealed that necessitate extension of the original procedure(s) or different procedure(s) than those set forth in Paragraph 1. I (we) authorize and request that the above-named physician, his/her assistants or his/her designees, perform procedures as necessary and desirable if deemed to be in my (our) best interest.     3. I acknowledge that other health care personnel may be observing this procedure for the purpose of medical education or other specified purposes as may be necessary as requested and/or approved by my (our) physician. 4. I (we) consent to the disposal by the hospital Pathologist of the removed tissue, parts or organs in accordance with hospital policy. Page 1 of 2          Name:  Elena Maxwell         :  1970         MR#:  [id number]      0. I do_x_ do not______ consent to the use of a local infiltration pain blocking agent that will be used by my provider/surgical provider to help alleviate pain during my procedure. 6. I do_____ do not_x_ consent to an emergent blood transfusion in the case of a life-threatening situation that requires blood components to be administered. This consent is valid for 24 hours from the beginning of the procedure. 7. This patient does _____ or does not _x__currently have a DNR status/order. If DNR order is in place, obtain \"Addendum to the Surgical Consent for ALL Patients with a DNR Order\" to address sonia-operative status for limited intervention or DNR suspension. 8. I have read and fully understand the above Consent for Operation/Procedure and that all blanks were completed before I signed the consent.    X____________________________________ _____Po TAYLOR Glaude_________   Date: 2022/_______am/pm   Signature of Patient or legal representative.    _____________________________ ____Cristina Sandoval, ATC____Nancy Pittman LPN_____   Date: /_______ am/pm   Witness to Signature Printed Name Date / Time    (If patient is unable to sign or is a minor, complete the following)     Patient is a minor, ____years of age, or unable to sign because:   _________________________________________________________________________  [facility] If a phone consent is obtained, consent will be documented by using two health care professionals, each affirming that the consenting party has no questions and gives consent for the procedure discussed with the physician/provider.     _________________________________ _______________________________   Date: ______/_____am/pm   2nd witness to phone consent Printed name Date / Time   Informed consent:   I have provided the explanation described above in section 1 to the patient and/or legal representative. I have provided the patient and/or legal representative with an opportunity to ask any questions about the proposed operation/procedure. _                       _ __CITLALI Cooney____ 2/22/2022/_______ am/pm   Provider / Proceduralist Printed Name Date / Time   This Provider / Proceduralist performing the surgery is ONLY for Office-based procedures in [place]   [x] Board certified or Board eligible by one of the [employer] [place], the [employer] of [facility] the [employer], the [employer] of Podiatric Medicine, the [employer] of Foot and Ankle Surgery, or other board as approved by the hospital for medical staff appointment.    Revised 8/2/2021 Page 2 of 2

## 2022-02-22 NOTE — PROGRESS NOTES
HISTORY OF PRESENT ILLNESS    Jb Phillips 1970 is a 46y.o. year old male comes in today to be evaluated and treated for: pain wrists    Since last appt has noticed great improvement in right side after injected de Quervaon's but pain significant left. Pain level 9/10. Using voltaren gel PRN with benefit. Noticeable benefit working at [employer]. IMAGING: XR left wrist 10/22/2021  IMPRESSION  1. No acute osseous findings. XR right wrist 2/3/2022  IMPRESSION  No acute bone findings. Please see report for details. If symptoms persist consider MRI. History reviewed. No pertinent surgical history. Social History     Socioeconomic History    Marital status: SINGLE   Tobacco Use    Smoking status: Never Smoker    Smokeless tobacco: Never Used   Vaping Use    Vaping Use: Never used   Substance and Sexual Activity    Alcohol use: Not Currently    Drug use: Never     Current Outpatient Medications   Medication Sig Dispense Refill    lisinopriL (PRINIVIL, ZESTRIL) 10 mg tablet lisinopril 10 mg tablet      ibuprofen (MOTRIN) 800 mg tablet       diclofenac (VOLTAREN) 1 % gel Apply 2 g to affected area every six (6) hours as needed for Pain (both wrists). 300 g 2    valACYclovir (VALTREX) 1 gram tablet Take  by mouth.  simvastatin (ZOCOR) 40 mg tablet Take  by mouth nightly.  cholecalciferol, vitamin D3, (VITAMIN D3) 2,000 unit tab Take  by mouth.  lansoprazole (PREVACID) 15 mg capsule Take  by mouth Daily (before breakfast).  montelukast (SINGULAIR) 10 mg tablet Take 10 mg by mouth daily.  (Patient not taking: Reported on 12/23/2021)       Past Medical History:   Diagnosis Date    Acute atopic conjunctivitis     Acute upper respiratory infections of unspecified site     Chronic cardiac arrhythmia     Elevated PSA     Herpes simplex infection     type 2    HTN (hypertension)     Morbid obesity ([address]     Pure hypercholesterolemia     Umbilical hernia     Unspecified sleep apnea     Unspecified vitamin D deficiency      Family History   Problem Relation Age of Onset    Hypertension Mother     Diabetes Mother     Cancer Maternal Uncle         Prostate Cancer         ROS:  + swell, less numbness arms w/ sleep    Objective:  Pulse 86   Resp 15   Ht 5' 11\" (1.803 m)   Wt 273 lb (123.8 kg)   SpO2 98%   BMI 38.08 kg/m²   NEURO:  Sensation intact light touch upper and lower extremities.  Biceps & Triceps reflexes +2/4 bilaterally.  left hand dominant. Spurling Positive bilateral   M/S:  bilateral  elbow/wrist: Positive TTP dorsal/volar tendons, worse with resisted use. Negative tommie tenderness. Phalen's negative.  Tinel's negative.  Strength +5/5 bilateral . [place] key sign Negative right.  Carpal bone motion normal.  Finklestein's positive left, much better right  TFCC Load Test negative.  BILATERAL bilateral epicondyle(s) with mild TTP worsened with wrist flexion/extension.   negative muscular atrophy. Assessment/Plan:     ICD-10-CM ICD-9-CM    1. De Quervain's tenosynovitis, bilateral  M65.4 727.04 AMB POC US, SONO GUIDE NEEDLE      INJECT TENDON SHEATH/LIGAMENT   2. Tendinitis of both wrists  M77.8 727.05        Patient (or guardian if minor) verbalizes understanding of evaluation and plan. Will inject left Wilson Arnaldo and continue current w/ brace/stretch and RTC as needed. Consider D25 not improved.

## 2022-02-22 NOTE — PATIENT INSTRUCTIONS
Search YouTube for my channel:    Dr. Capellan Deep cuff  Low back/Piriformis  Runner's Knee  Hip Stretches  Plantar Fasciitis  IT Band  Concussion  Pes Anserine/Plica  Gil Splints  Carpal Tunnel  Neck/Upper back

## 2022-02-22 NOTE — PROCEDURES
PROCEDURE NOTE:  Time out: 222pm  * Patient was identified by name and date of birth   * Agreement on procedure being performed was verified  * Risks and Benefits explained to the patient  * Procedure site verified and marked as necessary  * Patient was positioned for comfort  * Consent was signed and verified. Risks/benefits including but not limited to bleeding, infection, and scarring discussed and Pt wishes to proceed with procedure. The area was prepped with betadine. Under sterile technique with ethyl chloride spray and with ultrasound guidance using [employer] uSmart 3200T with 4-15 MHz linear transducer, indication for ultrasound guidance chonicity/habitus   1cc of 40mg/cc methylprednisolone acetate and 1cc lidocaine were injected into 1st dorsal compartment left wrist tendon sheath using distal approach with radial styloid as a giude. Sterile gauze used to clean the area. Blood loss minimal.  Noticed improvement in pain Sx within 5 minutes (now rated 1/10). Tolerated procedure well. Discussed possible signs/Sx of infxn, and advised to seek care if concerned. Ultrasound images (if applicable) digitally attached to CPT order in patients chart.

## 2022-05-02 ENCOUNTER — HOSPITAL ENCOUNTER (OUTPATIENT)
Dept: LAB | Age: 52
Discharge: HOME | End: 2022-05-02

## 2022-05-02 LAB — XX-LABCORP SPECIMEN COL,LCBCF: NORMAL

## 2022-05-02 PROCEDURE — 99001 SPECIMEN HANDLING PT-LAB: CPT

## 2022-05-09 ENCOUNTER — HOSPITAL ENCOUNTER (OUTPATIENT)
Dept: LAB | Age: 52
Discharge: HOME | End: 2022-05-09

## 2022-05-09 LAB — XX-LABCORP SPECIMEN COL,LCBCF: NORMAL

## 2022-05-09 PROCEDURE — 99001 SPECIMEN HANDLING PT-LAB: CPT

## 2022-06-22 ENCOUNTER — OFFICE VISIT (OUTPATIENT)
Dept: ORTHOPEDIC SURGERY | Age: 52
End: 2022-06-22
Payer: COMMERCIAL

## 2022-06-22 VITALS
HEART RATE: 85 BPM | HEIGHT: 71 IN | WEIGHT: 238 LBS | RESPIRATION RATE: 15 BRPM | OXYGEN SATURATION: 96 % | BODY MASS INDEX: 33.32 KG/M2

## 2022-06-22 DIAGNOSIS — M77.8 TENDINITIS OF BOTH WRISTS: ICD-10-CM

## 2022-06-22 DIAGNOSIS — M65.4 DE QUERVAIN'S TENOSYNOVITIS, BILATERAL: Primary | ICD-10-CM

## 2022-06-22 PROCEDURE — 76942 ECHO GUIDE FOR BIOPSY: CPT | Performed by: FAMILY MEDICINE

## 2022-06-22 PROCEDURE — 20550 NJX 1 TENDON SHEATH/LIGAMENT: CPT | Performed by: FAMILY MEDICINE

## 2022-06-22 RX ORDER — LIDOCAINE 50 MG/G
OINTMENT TOPICAL AS NEEDED
Qty: 240 G | Refills: 1 | Status: SHIPPED | OUTPATIENT
Start: 2022-06-22

## 2022-06-22 NOTE — LETTER
Name:  Emerson Xiong   :  1970  MR#:  [id number]   PROCEDURAL INFORMED CONSENT FOR OPERATION / PROCEDURE   1. I (we), _____Po Suh__________ authorize Key Valentino DO, FAOASM                       (Patient Name)     (Provider / Proceduralist)   and/or such assistants as may be selected by him/her, to perform the following operation/procedures   _______________inject D25 into right wrst de Quervain's_____________________  _________________________________________________________________________  Note: If unable to obtain consent prior to an emergent procedure, document the emergent reason in the medical record. This procedure has been explained to my (our) satisfaction and included in the explanation was:   A) the intended benefit, nature, and extent of the procedure to be performed;   B) the significant risks involved and the probability of success;   C) alternative procedures and methods of treatment;   D) the dangers and probable consequences of such alternatives (including no procedure or treatment); E) the expected consequences of the procedure on my future health;   F) whether other qualified individuals would be performing important surgical tasks and / or whether  would be present to advise or support the procedure. I (we) understand that there are other risks of infection and other serious complications in the pre-operative/procedural and postoperative/procedural stages of my (our) care. I (we) have asked all of the questions which I (we) thought were important in deciding whether or not to undergo treatment or diagnosis. These questions have been answered to my (our) satisfaction. I (we) understand that no assurance can be given that the procedure will be a success, and no guarantee or warranty of success has been given to me (us).    2. It has been explained to me (us) that during the course of the operation/procedure, unforeseen conditions may be revealed that necessitate extension of the original procedure(s) or different procedure(s) than those set forth in Paragraph 1. I (we) authorize and request that the above-named physician, his/her assistants or his/her designees, perform procedures as necessary and desirable if deemed to be in my (our) best interest.     3. I acknowledge that other health care personnel may be observing this procedure for the purpose of medical education or other specified purposes as may be necessary as requested and/or approved by my (our) physician. 4. I (we) consent to the disposal by the hospital Pathologist of the removed tissue, parts or organs in accordance with hospital policy. Page 1 of 2          Name:  Sapna Rosas         :  1970         MR#:  [id number]      7. I do__x__ do not____ consent to the use of a local infiltration pain blocking agent that will be used by my provider/surgical provider to help alleviate pain during my procedure. 6. I do___ do not__X__ consent to an emergent blood transfusion in the case of a life-threatening situation that requires blood components to be administered. This consent is valid for 24 hours from the beginning of the procedure. 7. This patient does ___ or does not __X__currently have a DNR status/order. If DNR order is in place, obtain \"Addendum to the Surgical Consent for ALL Patients with a DNR Order\" to address sonia-operative status for limited intervention or DNR suspension. 8. I have read and fully understand the above Consent for Operation/Procedure and that all blanks were completed before I signed the consent.    X____________________________________ _____Po TAYLOR Glaude_________   Date: 2022/_______am/pm   Signature of Patient or legal representative.    _____________________________ ____Cristina Gonzalez, ATC____Nancy Flores LPN_____   Date: /_______ am/pm   Witness to Signature Printed Name Date / Time    (If patient is unable to sign or is a minor, complete the following)     Patient is a minor, ____years of age, or unable to sign because:   _________________________________________________________________________  [place] [facility] If a phone consent is obtained, consent will be documented by using two health care professionals, each affirming that the consenting party has no questions and gives consent for the procedure discussed with the physician/provider.     _________________________________ _______________________________   Date: ______/_____am/pm   2nd witness to phone consent Printed name Date / Time   Informed consent:   I have provided the explanation described above in section 1 to the patient and/or legal representative. I have provided the patient and/or legal representative with an opportunity to ask any questions about the proposed operation/procedure. _                       _ __Dannie Rizo SenderCITLALI____ 6/22/2022/_______ am/pm   Provider / Proceduralist Printed Name Date / Time   This Provider / Proceduralist performing the surgery is ONLY for Office-based procedures in [place]   [x] Board certified or Board eligible by one of the [employer] [place], the [employer] of The [place] of the [employer], the [employer] of Podiatric Medicine, the [employer] of Foot and Ankle Surgery, or other board as approved by the hospital for medical staff appointment.    Revised 8/2/2021 Page 2 of 2

## 2022-06-22 NOTE — PATIENT INSTRUCTIONS
If you had a prolotherapy (D25) injection done today you should notice pain is much better for 3-4 hours, then will likely return to prior levels with the possibility of increased warmth at injection area. If excessive pain, redness, or fever develop please notify us. Avoid all antiinflammatory medicines (meloxicam, ibuprofen, diclofenac, naproxen, etc.) and ice to allow the best response until follow-up in roughtly 4 weeks. Tylenol is okay for pain if needed.

## 2022-06-22 NOTE — PROCEDURES
PROCEDURE NOTE:  Time out: 402pm  * Patient was identified by name and date of birth   * Agreement on procedure being performed was verified  * Risks and Benefits explained to the patient  * Procedure site verified and marked as necessary  * Patient was positioned for comfort  * Consent was signed and verified. Risks/benefits including but not limited to bleeding, infection, and scarring discussed and Pt wishes to proceed with procedure. The area was prepped with betadine. Ethyl chloride spray was used. Under sterile technique with ultrasound guidance using [employer] uSmart 3200T with 4-15 MHz linear transducer. 1cc of D50mg/cc with 1cc lidocaine were injected into point of maximal tenderness of right wrist 1st dorsal compartment using distal approach. Sterile gauze used to clean the area. Blood loss minimal.  Noticed improvement in pain Sx within 5 minutes (now rated 0/10). Tolerated procedure well. Discussed possible signs/Sx of infxn, and advised to seek care if concerned. Ultrasound images (if applicable) digitally attached to CPT order in patients chart.

## 2022-06-22 NOTE — LETTER
6/22/2022    Patient: Genny Laurent   YOB: 1970   Date of Visit: 6/22/2022     Letitia Jones MD  [address] [address]  Via Outside Provider Messaging    Dear Letitia Jones MD,      Thank you for [facility] [place] [facility] [place] [address] [place]. My notes for this consultation are attached. If you have questions, please do not hesitate to call me. I look forward to following your patient along with you.       Sincerely,    Sha Terrazas, DO

## 2022-06-22 NOTE — PROGRESS NOTES
HISTORY OF PRESENT ILLNESS    Rochelle Cabrera 1970 is a 46y.o. year old male comes in today to be evaluated and treated for: wrist pain    Since last appt has noticed pain improved left after injected last appt but flared right the last month or so. Pain level 9/10. Using braces/voltaren gel with some benefit. Works at [employer]. IMAGING: XR left wrist 10/22/2021  IMPRESSION  1. No acute osseous findings.     XR right wrist 2/3/2022  IMPRESSION  No acute bone findings. Please see report for details. If symptoms persist consider MRI. History reviewed. No pertinent surgical history. Social History     Socioeconomic History    Marital status: SINGLE   Tobacco Use    Smoking status: Never Smoker    Smokeless tobacco: Never Used   Vaping Use    Vaping Use: Never used   Substance and Sexual Activity    Alcohol use: Not Currently    Drug use: Never     Current Outpatient Medications   Medication Sig Dispense Refill    lisinopriL (PRINIVIL, ZESTRIL) 10 mg tablet lisinopril 10 mg tablet      ibuprofen (MOTRIN) 800 mg tablet       diclofenac (VOLTAREN) 1 % gel Apply 2 g to affected area every six (6) hours as needed for Pain (both wrists). 300 g 2    valACYclovir (VALTREX) 1 gram tablet Take  by mouth.  simvastatin (ZOCOR) 40 mg tablet Take  by mouth nightly.  cholecalciferol, vitamin D3, (VITAMIN D3) 2,000 unit tab Take  by mouth.  lansoprazole (PREVACID) 15 mg capsule Take  by mouth Daily (before breakfast).  montelukast (SINGULAIR) 10 mg tablet Take 10 mg by mouth daily.  (Patient not taking: Reported on 12/23/2021)       Past Medical History:   Diagnosis Date    Acute atopic conjunctivitis     Acute upper respiratory infections of unspecified site     Chronic cardiac arrhythmia     Elevated PSA     Herpes simplex infection     type 2    HTN (hypertension)     Morbid obesity ([address]     Pure hypercholesterolemia     Umbilical hernia     Unspecified sleep apnea     Unspecified vitamin D deficiency      Family History   Problem Relation Age of Onset    Hypertension Mother     Diabetes Mother     Cancer Maternal Uncle         Prostate Cancer         ROS:  + swell, less numbness arms w/ sleep    Objective:  Pulse 85   Resp 15   Ht 5' 11\" (1.803 m)   Wt 238 lb (108 kg)   SpO2 96%   BMI 33.19 kg/m²   NEURO:  Sensation intact light touch upper and lower extremities.  Biceps & Triceps reflexes +2/4 bilaterally.  left hand dominant. Spurling Positive bilateral   M/S:  bilateral elbow/wrist: Positive TTP dorsal/volar tendons, worse with resisted use right. Negative tommie tenderness. Phalen's negative.  Tinel's negative.  Strength +5/5 bilateral . [place] jacobs sign Negative right.  Carpal bone motion normal.  Finklestein's positive left, much better right  TFCC Load Test negative.  BILATERAL bilateral epicondyle(s) with mild TTP worsened with wrist flexion/extension.   negative muscular atrophy. Assessment/Plan:     ICD-10-CM ICD-9-CM    1. De Quervain's tenosynovitis, bilateral  M65.4 727.04 AMB POC US, SONO GUIDE NEEDLE      INJECT TENDON SHEATH/LIGAMENT      lidocaine (XYLOCAINE) 5 % ointment   2. Tendinitis of both wrists  M77.8 727.05        Patient (or guardian if minor) verbalizes understanding of evaluation and plan. Will inject D25 right and Rx lidocaine gel as above and plan follow-up 4 weeks. No NSAID/ice until then. Total time spent on encounter including chart/imaging/lab review and evaluation/documentation/demo home program/coordination of care/form completion but not including time for any procedures/manipulation/FMLA forms 35 minutes.

## 2022-09-20 ENCOUNTER — OFFICE VISIT (OUTPATIENT)
Dept: ORTHOPEDIC SURGERY | Age: 52
End: 2022-09-20
Payer: COMMERCIAL

## 2022-09-20 VITALS
HEIGHT: 71 IN | WEIGHT: 248 LBS | OXYGEN SATURATION: 97 % | HEART RATE: 87 BPM | RESPIRATION RATE: 14 BRPM | BODY MASS INDEX: 34.72 KG/M2

## 2022-09-20 DIAGNOSIS — M65.4 DE QUERVAIN'S TENOSYNOVITIS, RIGHT: ICD-10-CM

## 2022-09-20 DIAGNOSIS — M65.4 DE QUERVAIN'S TENOSYNOVITIS, BILATERAL: Primary | ICD-10-CM

## 2022-09-20 DIAGNOSIS — M77.8 TENDINITIS OF BOTH WRISTS: ICD-10-CM

## 2022-09-20 PROCEDURE — 20550 NJX 1 TENDON SHEATH/LIGAMENT: CPT | Performed by: FAMILY MEDICINE

## 2022-09-20 PROCEDURE — 76942 ECHO GUIDE FOR BIOPSY: CPT | Performed by: FAMILY MEDICINE

## 2022-09-20 RX ORDER — METHYLPREDNISOLONE ACETATE 40 MG/ML
40 INJECTION, SUSPENSION INTRA-ARTICULAR; INTRALESIONAL; INTRAMUSCULAR; SOFT TISSUE ONCE
Status: COMPLETED | OUTPATIENT
Start: 2022-09-20 | End: 2022-09-20

## 2022-09-20 RX ORDER — DICLOFENAC SODIUM 10 MG/G
2 GEL TOPICAL
Qty: 300 G | Refills: 2 | Status: SHIPPED | OUTPATIENT
Start: 2022-09-20

## 2022-09-20 RX ADMIN — METHYLPREDNISOLONE ACETATE 40 MG: 40 INJECTION, SUSPENSION INTRA-ARTICULAR; INTRALESIONAL; INTRAMUSCULAR; SOFT TISSUE at 14:16

## 2022-09-20 NOTE — PROCEDURES
PROCEDURE NOTE:  Time out: 205pm  * Patient was identified by name and date of birth   * Agreement on procedure being performed was verified  * Risks and Benefits explained to the patient  * Procedure site verified and marked as necessary  * Patient was positioned for comfort  * Consent was signed and verified. Risks/benefits including but not limited to bleeding, infection, and scarring discussed and Pt wishes to proceed with procedure. The area was prepped with betadine. Under sterile technique with ethyl chloride spray and with ultrasound guidance using [employer] uSmart 3200T with 4-15 MHz linear transducer, indication for ultrasound guidance habitus    1cc of 40mg/cc methylprednisolone acetate and 1cc mepivacaine were injected into 1st dorsal compartment right wrist tendon sheath using distal approach with radial styloid as a giude. Sterile gauze used to clean the area. Blood loss minimal.  Noticed improvement in pain Sx within 5 minutes (now rated 1/10). Tolerated procedure well. Discussed possible signs/Sx of infxn, and advised to seek care if concerned. Ultrasound images (if applicable) digitally attached to CPT order in patients chart.

## 2022-09-20 NOTE — LETTER
Name:  Sandy Morse   :  1970  MR#:  [id number]   PROCEDURAL INFORMED CONSENT FOR OPERATION / PROCEDURE   1. I (we), _____Po Suh__________ authorize Devorah Henry DO, FAOASM                       (Patient Name)     (Provider / Proceduralist)   and/or such assistants as may be selected by him/her, to perform the following operation/procedures   _______________inject steroid into right wrist de Quervain's_____________________  _________________________________________________________________________  Note: If unable to obtain consent prior to an emergent procedure, document the emergent reason in the medical record. This procedure has been explained to my (our) satisfaction and included in the explanation was:   A) the intended benefit, nature, and extent of the procedure to be performed;   B) the significant risks involved and the probability of success;   C) alternative procedures and methods of treatment;   D) the dangers and probable consequences of such alternatives (including no procedure or treatment); E) the expected consequences of the procedure on my future health;   F) whether other qualified individuals would be performing important surgical tasks and / or whether  would be present to advise or support the procedure. I (we) understand that there are other risks of infection and other serious complications in the pre-operative/procedural and postoperative/procedural stages of my (our) care. I (we) have asked all of the questions which I (we) thought were important in deciding whether or not to undergo treatment or diagnosis. These questions have been answered to my (our) satisfaction. I (we) understand that no assurance can be given that the procedure will be a success, and no guarantee or warranty of success has been given to me (us).    2. It has been explained to me (us) that during the course of the operation/procedure, unforeseen conditions may be revealed that necessitate extension of the original procedure(s) or different procedure(s) than those set forth in Paragraph 1. I (we) authorize and request that the above-named physician, his/her assistants or his/her designees, perform procedures as necessary and desirable if deemed to be in my (our) best interest.     3. I acknowledge that other health care personnel may be observing this procedure for the purpose of medical education or other specified purposes as may be necessary as requested and/or approved by my (our) physician. 4. I (we) consent to the disposal by the hospital Pathologist of the removed tissue, parts or organs in accordance with hospital policy. Page 1 of 2          Name:  Josiah Singh         :  1970         MR#:  [id number]      5. I do__x__ do not____ consent to the use of a local infiltration pain blocking agent that will be used by my provider/surgical provider to help alleviate pain during my procedure. 6. I do___ do not__X__ consent to an emergent blood transfusion in the case of a life-threatening situation that requires blood components to be administered. This consent is valid for 24 hours from the beginning of the procedure. 7. This patient does ___ or does not __X__currently have a DNR status/order. If DNR order is in place, obtain \"Addendum to the Surgical Consent for ALL Patients with a DNR Order\" to address sonia-operative status for limited intervention or DNR suspension. 8. I have read and fully understand the above Consent for Operation/Procedure and that all blanks were completed before I signed the consent.    X____________________________________ _____Po TAYLOR Glaamarilis_________   Date: 2022/_______am/pm   Signature of Patient or legal representative.    _____________________________ ____________________Nancy Benites LPN_____   Date: /_______ am/pm   Witness to Signature Printed Name Date / Time    (If patient is unable to sign or is a minor, complete the following)     Patient is a minor, ____years of age, or unable to sign because:   _________________________________________________________________________  Raoul Rizvi If a phone consent is obtained, consent will be documented by using two health care professionals, each affirming that the consenting party has no questions and gives consent for the procedure discussed with the physician/provider.     _________________________________ _______________________________   Date: ______/_____am/pm   2nd witness to phone consent Printed name Date / Time   Informed consent:   I have provided the explanation described above in section 1 to the patient and/or legal representative. I have provided the patient and/or legal representative with an opportunity to ask any questions about the proposed operation/procedure. _                       _ __CITLALI Bloom____ 9/20/2022/_______ am/pm   Provider / Proceduralist Printed Name Date / Time   This Provider / Proceduralist performing the surgery is ONLY for Office-based procedures in [place]   [x] Board certified or Board eligible by one of the [employer] [place], the [employer] of The [place] of the [employer], the [employer] of Podiatric Medicine, the [employer] of Foot and Ankle Surgery, or other board as approved by the hospital for medical staff appointment.    Revised 8/2/2021 Page 2 of 2

## 2022-09-20 NOTE — LETTER
9/20/2022    Patient: Teodora Tam   YOB: 1970   Date of Visit: 9/20/2022     Nayla Meredith MD  [address] [address]  Via Outside Provider Messaging    Dear Nayla Meredith MD,      Thank you for referring Mr. Iona Rothman to [place] [address]. for evaluation. My notes for this consultation are attached. If you have questions, please do not hesitate to call me. I look forward to following your patient along with you.       Sincerely,    Brianna Monroy, DO

## 2022-09-20 NOTE — PROGRESS NOTES
HISTORY OF PRESENT ILLNESS    Todd Shoemaker 1970 is a 46y.o. year old male comes in today to be evaluated and treated for: right wrist pain    Since last appt has noticed pain improved some after D25 injected [id number]. Pain level 5/10. Using braces with benefit. Also did use voltaren gel after D25. Also some Sx left and hoping for steroid injections as opposed to D25. Worsens working sorting mail. IMAGING: XR left wrist 10/22/2021  IMPRESSION  1. No acute osseous findings. XR right wrist 2/3/2022  IMPRESSION  No acute bone findings. Please see report for details. If symptoms persist consider MRI. History reviewed. No pertinent surgical history. Social History     Socioeconomic History    Marital status: SINGLE   Tobacco Use    Smoking status: Never    Smokeless tobacco: Never   Vaping Use    Vaping Use: Never used   Substance and Sexual Activity    Alcohol use: Not Currently    Drug use: Never     Current Outpatient Medications   Medication Sig Dispense Refill    lidocaine (XYLOCAINE) 5 % ointment Apply  to affected area as needed for Pain (both thumbs/wrists). Apply pea sized amount to either thumb/wrist every 4 hours as needed. 240 g 1    lisinopriL (PRINIVIL, ZESTRIL) 10 mg tablet lisinopril 10 mg tablet      ibuprofen (MOTRIN) 800 mg tablet       diclofenac (VOLTAREN) 1 % gel Apply 2 g to affected area every six (6) hours as needed for Pain (both wrists). 300 g 2    valACYclovir (VALTREX) 1 gram tablet Take  by mouth. simvastatin (ZOCOR) 40 mg tablet Take  by mouth nightly. cholecalciferol, vitamin D3, 50 mcg (2,000 unit) tab Take  by mouth.      lansoprazole (PREVACID) 15 mg capsule Take  by mouth Daily (before breakfast).        Past Medical History:   Diagnosis Date    Acute atopic conjunctivitis     Acute upper respiratory infections of unspecified site     Chronic cardiac arrhythmia     Elevated PSA     Herpes simplex infection     type 2    HTN (hypertension)     Morbid obesity ([facility] [address]     Pure hypercholesterolemia     Umbilical hernia     Unspecified sleep apnea     Unspecified vitamin D deficiency      Family History   Problem Relation Age of Onset    Hypertension Mother     Diabetes Mother     Cancer Maternal Uncle         Prostate Cancer         ROS:  swell, mild numbness arms w/ sleep    Objective:  Pulse 87   Resp 14   Ht 5' 11\" (1.803 m)   Wt 248 lb (112.5 kg)   SpO2 97%   BMI 34.59 kg/m²   NEURO:  Sensation intact light touch upper and lower extremities. Biceps & Triceps reflexes +2/4 bilaterally. left hand dominant. Spurling Positive bilateral   M/S:  bilateral elbow/wrist: Positive TTP dorsal/volar tendons, worse with resisted use right. Negative tommie tenderness. Phalen's negative. Tinel's negative. Strength +5/5 bilateral .  Piano key sign Negative right. Carpal bone motion normal.  Finklestein's positive left, much better right  TFCC Load Test negative. BILATERAL bilateral epicondyle(s) with mild TTP worsened with wrist flexion/extension. negative muscular atrophy. Assessment/Plan:     ICD-10-CM ICD-9-CM    1. De Quervain's tenosynovitis, bilateral  M65.4 727.04 AMB POC US, SONO GUIDE NEEDLE      INJECT TENDON SHEATH/LIGAMENT      diclofenac (VOLTAREN) 1 % gel      2. Tendinitis of both wrists  M77.8 727.05 diclofenac (VOLTAREN) 1 % gel          Patient (or guardian if minor) verbalizes understanding of evaluation and plan. Will continue HEP and voltaren gel Rx after inject right de Quervain and return 3 month PRN for next, sooner if would like left injected. Total time spent on encounter including chart/imaging/lab review and evaluation/documentation/demo home program/coordination of care/form completion but not including time for any procedures/manipulation 31 minutes.

## 2022-10-04 ENCOUNTER — OFFICE VISIT (OUTPATIENT)
Dept: ORTHOPEDIC SURGERY | Age: 52
End: 2022-10-04
Payer: COMMERCIAL

## 2022-10-04 VITALS — WEIGHT: 247 LBS | RESPIRATION RATE: 14 BRPM | BODY MASS INDEX: 34.58 KG/M2 | HEIGHT: 71 IN

## 2022-10-04 DIAGNOSIS — M77.8 TENDINITIS OF BOTH WRISTS: ICD-10-CM

## 2022-10-04 DIAGNOSIS — M65.4 DE QUERVAIN'S TENOSYNOVITIS, LEFT: Primary | ICD-10-CM

## 2022-10-04 PROCEDURE — 76942 ECHO GUIDE FOR BIOPSY: CPT | Performed by: FAMILY MEDICINE

## 2022-10-04 PROCEDURE — 20550 NJX 1 TENDON SHEATH/LIGAMENT: CPT | Performed by: FAMILY MEDICINE

## 2022-10-04 RX ORDER — METHYLPREDNISOLONE ACETATE 40 MG/ML
40 INJECTION, SUSPENSION INTRA-ARTICULAR; INTRALESIONAL; INTRAMUSCULAR; SOFT TISSUE ONCE
Status: COMPLETED | OUTPATIENT
Start: 2022-10-04 | End: 2022-10-04

## 2022-10-04 RX ADMIN — METHYLPREDNISOLONE ACETATE 40 MG: 40 INJECTION, SUSPENSION INTRA-ARTICULAR; INTRALESIONAL; INTRAMUSCULAR; SOFT TISSUE at 12:01

## 2022-10-04 NOTE — PROCEDURES
PROCEDURE NOTE:  Time out: 1147am  * Patient was identified by name and date of birth   * Agreement on procedure being performed was verified  * Risks and Benefits explained to the patient  * Procedure site verified and marked as necessary  * Patient was positioned for comfort  * Consent was signed and verified. Risks/benefits including but not limited to bleeding, infection, and scarring discussed and Pt wishes to proceed with procedure. The area was prepped with betadine. Under sterile technique with ethyl chloride spray and with ultrasound guidance using [employer] uSmart 3200T with 4-15 MHz linear transducer, indication for ultrasound guidance habitus    1cc of 40mg/cc methylprednisolone acetate and 1cc mepivacaine were injected into 1st dorsal compartment left wrist tendon sheath using distal approach with radial styloid as a giude. Sterile gauze used to clean the area. Blood loss minimal.  Noticed improvement in pain Sx within 5 minutes (now rated 1/10). Tolerated procedure well. Discussed possible signs/Sx of infxn, and advised to seek care if concerned. Ultrasound images (if applicable) digitally attached to CPT order in patients chart.

## 2022-10-04 NOTE — LETTER
10/4/2022    Patient: Ramesh Al   YOB: 1970   Date of Visit: 10/4/2022     Jayla Phelps MD  [address] [address]  Via Outside Provider Messaging    Dear Jayla Phelps MD,      Thank you for [place] [address]. My notes for this consultation are attached. If you have questions, please do not hesitate to call me. I look forward to following your patient along with you.       Sincerely,    Chantelle Ross, DO

## 2022-10-04 NOTE — LETTER
Name:  Jumana Panda   :  1970  MR#:  [id number]   PROCEDURAL INFORMED CONSENT FOR OPERATION / PROCEDURE   1. I (we), _____Po Suh__________ authorize Ed Cantor DO, FAOASM                       (Patient Name)     (Provider / Proceduralist)   and/or such assistants as may be selected by him/her, to perform the following operation/procedures   _______________inject steroid into left wrist de Quervain's_____________________  _________________________________________________________________________  Note: If unable to obtain consent prior to an emergent procedure, document the emergent reason in the medical record. This procedure has been explained to my (our) satisfaction and included in the explanation was:   A) the intended benefit, nature, and extent of the procedure to be performed;   B) the significant risks involved and the probability of success;   C) alternative procedures and methods of treatment;   D) the dangers and probable consequences of such alternatives (including no procedure or treatment); E) the expected consequences of the procedure on my future health;   F) whether other qualified individuals would be performing important surgical tasks and / or whether  would be present to advise or support the procedure. I (we) understand that there are other risks of infection and other serious complications in the pre-operative/procedural and postoperative/procedural stages of my (our) care. I (we) have asked all of the questions which I (we) thought were important in deciding whether or not to undergo treatment or diagnosis. These questions have been answered to my (our) satisfaction. I (we) understand that no assurance can be given that the procedure will be a success, and no guarantee or warranty of success has been given to me (us).    2. It has been explained to me (us) that during the course of the operation/procedure, unforeseen conditions may be revealed that necessitate extension of the original procedure(s) or different procedure(s) than those set forth in Paragraph 1. I (we) authorize and request that the above-named physician, his/her assistants or his/her designees, perform procedures as necessary and desirable if deemed to be in my (our) best interest.     3. I acknowledge that other health care personnel may be observing this procedure for the purpose of medical education or other specified purposes as may be necessary as requested and/or approved by my (our) physician. 4. I (we) consent to the disposal by the hospital Pathologist of the removed tissue, parts or organs in accordance with hospital policy. Page 1 of 2          Name:  Sailaja Price         :  1970         MR#:  [id number]      5. I do__x__ do not____ consent to the use of a local infiltration pain blocking agent that will be used by my provider/surgical provider to help alleviate pain during my procedure. 6. I do___ do not__X__ consent to an emergent blood transfusion in the case of a life-threatening situation that requires blood components to be administered. This consent is valid for 24 hours from the beginning of the procedure. 7. This patient does ___ or does not __X__currently have a DNR status/order. If DNR order is in place, obtain \"Addendum to the Surgical Consent for ALL Patients with a DNR Order\" to address sonia-operative status for limited intervention or DNR suspension. 8. I have read and fully understand the above Consent for Operation/Procedure and that all blanks were completed before I signed the consent.    X____________________________________ _____Po TAYLOR Glaamarilis_________   Date: 10/4/2022/_______am/pm   Signature of Patient or legal representative.    _____________________________ ____________________Nancy Cobian LPN_____   Date: 1431/_______ am/pm   Witness to Signature Printed Name Date / Time    (If patient is unable to sign or is a minor, complete the following)     Patient is a minor, ____years of age, or unable to sign because:   _________________________________________________________________________  Charmaine Lucia If a phone consent is obtained, consent will be documented by using two health care professionals, each affirming that the consenting party has no questions and gives consent for the procedure discussed with the physician/provider.     _________________________________ _______________________________   Date: ______/_____am/pm   2nd witness to phone consent Printed name Date / Time   Informed consent:   I have provided the explanation described above in section 1 to the patient and/or legal representative. I have provided the patient and/or legal representative with an opportunity to ask any questions about the proposed operation/procedure. _                       _ __CITLALI Pitts____ 10/4/2022/_______ am/pm   Provider / Proceduralist Printed Name Date / Time   This Provider / Proceduralist performing the surgery is ONLY for Office-based procedures in [place]   [x] Board certified or Board eligible by one of the [employer] of [place], the [employer] of The [place] of the [employer], the [employer] of Podiatric Medicine, the [employer] of Foot and Ankle Surgery, or other board as approved by the hospital for medical staff appointment.    Revised 8/2/2021 Page 2 of 2

## 2022-10-04 NOTE — PROGRESS NOTES
HISTORY OF PRESENT ILLNESS    Meagan Ariza 1970 is a 46y.o. year old male comes in today to be evaluated and treated for: left wrist    Since last appt has noticed pain improved right wrist after injected de Quervains 9/20/2022. Pain level 8/10. Using gel/splint with some benefit. Hoping for cortisone injection left today. Tried D25 prior but used volatren gel after. IMAGING: XR left wrist 10/22/2021  IMPRESSION  1. No acute osseous findings. XR right wrist 2/3/2022  IMPRESSION  No acute bone findings. Please see report for details. If symptoms persist consider MRI. History reviewed. No pertinent surgical history. Social History     Socioeconomic History    Marital status: SINGLE   Tobacco Use    Smoking status: Never    Smokeless tobacco: Never   Vaping Use    Vaping Use: Never used   Substance and Sexual Activity    Alcohol use: Not Currently    Drug use: Never     Current Outpatient Medications   Medication Sig Dispense Refill    diclofenac (VOLTAREN) 1 % gel Apply 2 g to affected area every six (6) hours as needed for Pain (both wrists). 300 g 2    lidocaine (XYLOCAINE) 5 % ointment Apply  to affected area as needed for Pain (both thumbs/wrists). Apply pea sized amount to either thumb/wrist every 4 hours as needed. 240 g 1    lisinopriL (PRINIVIL, ZESTRIL) 10 mg tablet lisinopril 10 mg tablet      ibuprofen (MOTRIN) 800 mg tablet       valACYclovir (VALTREX) 1 gram tablet Take  by mouth. simvastatin (ZOCOR) 40 mg tablet Take  by mouth nightly. cholecalciferol, vitamin D3, 50 mcg (2,000 unit) tab Take  by mouth.      lansoprazole (PREVACID) 15 mg capsule Take  by mouth Daily (before breakfast).        Past Medical History:   Diagnosis Date    Acute atopic conjunctivitis     Acute upper respiratory infections of unspecified site     Chronic cardiac arrhythmia     Elevated PSA     Herpes simplex infection     type 2    HTN (hypertension)     Morbid obesity ([address]     Pure hypercholesterolemia     Umbilical hernia     Unspecified sleep apnea     Unspecified vitamin D deficiency      Family History   Problem Relation Age of Onset    Hypertension Mother     Diabetes Mother     Cancer Maternal Uncle         Prostate Cancer         ROS:  Some swell    Objective:  Resp 14   Ht 5' 11\" (1.803 m)   Wt 247 lb (112 kg)   BMI 34.45 kg/m²   NEURO:  Sensation intact light touch upper and lower extremities. Biceps & Triceps reflexes +2/4 bilaterally. left hand dominant. Spurling Positive bilateral   M/S:  bilateral elbow/wrist: Positive TTP dorsal/volar tendons, worse with resisted use left. Negative tommie tenderness. Phalen's negative. Tinel's negative. Strength +5/5 bilateral .  Piano key sign Negative right. Carpal bone motion normal.  Finklestein's positive left, much better right  TFCC Load Test negative. BILATERAL bilateral epicondyle(s) with mild TTP worsened with wrist flexion/extension. negative muscular atrophy. Assessment/Plan:     ICD-10-CM ICD-9-CM    1. De Quervain's tenosynovitis, left  M65.4 727.04 AMB POC US, SONO GUIDE NEEDLE      INJECT TENDON SHEATH/LIGAMENT      2. Tendinitis of both wrists  M77.8 727.05           Patient (or guardian if minor) verbalizes understanding of evaluation and plan. Will inject left de Quervain and continue as prior. RTC PRN.

## 2022-10-04 NOTE — PATIENT INSTRUCTIONS
If you had a joint injection done today by Dr. Idalia Duncan, you should notice pain is much better for 3-4 hours, then will likely return to prior levels until 2-3 days from now when the cortisone kicks in. Any issues with redness, increased pain or heat in the joint or a fever please reach out to our office at [phone].

## 2024-02-14 ENCOUNTER — OFFICE VISIT (OUTPATIENT)
Age: 54
End: 2024-02-14
Payer: COMMERCIAL

## 2024-02-14 VITALS
BODY MASS INDEX: 39.73 KG/M2 | RESPIRATION RATE: 16 BRPM | HEART RATE: 92 BPM | OXYGEN SATURATION: 97 % | WEIGHT: 283.8 LBS | DIASTOLIC BLOOD PRESSURE: 98 MMHG | SYSTOLIC BLOOD PRESSURE: 142 MMHG | HEIGHT: 71 IN | TEMPERATURE: 97.8 F

## 2024-02-14 DIAGNOSIS — K42.0 INCARCERATED UMBILICAL HERNIA: Primary | ICD-10-CM

## 2024-02-14 PROCEDURE — 99204 OFFICE O/P NEW MOD 45 MIN: CPT | Performed by: SURGERY

## 2024-02-14 NOTE — PROGRESS NOTES
Jasson Draper is a 53 y.o. male (: 1970)     Chief Complaint   Patient presents with    New Patient     Umbilical hernia        Medication list and allergies have been reviewed with Jasson Draper and updated as of today's date.     I have gone over all Medical, Surgical and Social History with Jasson Draper and updated/added the information accordingly.

## 2024-02-14 NOTE — PROGRESS NOTES
CC:   Chief Complaint   Patient presents with    New Patient     Umbilical hernia         Assessment:    ICD-10-CM    1. Incarcerated umbilical hernia  K42.0 SCHEDULE SURGERY          Plan: He has a symptomatic umbilical hernia incarcerated with fat.  I recommended laparoscopic umbilical hernia repair with mesh.  The risks and benefits of the procedure were reviewed with the patient including infection, bleeding, need for repeat procedure,injury to surrounding structures, recurrent hernia. Post operative expectations including lifting restrictions were reviewed. Questions were answered.         HPI:  Jasson Draper is a 53 y.o. male who is referred by Bridget Sabillon PA for umbilical hernia.  He states he noticed bulging at the umbilicus over a year ago.  Since that time the hernia has gotten much larger and painful.  He states touching this area causes discomfort as well as straining with bowel movements.  No changes to bowel or bladder habits.  He denies any previous abdominal surgery.  He states his weight is stable.  No nausea or vomiting.    Allergies:  No Known Allergies    Medication Review:  Current Outpatient Medications on File Prior to Visit   Medication Sig Dispense Refill    Cholecalciferol 50 MCG (2000 UT) TABS Take by mouth      diclofenac sodium (VOLTAREN) 1 % GEL Apply 2 g topically every 6 hours as needed      ibuprofen (ADVIL;MOTRIN) 800 MG tablet ceived the following from [facility]: Outside name: ibuprofen (MOTRIN) 800 mg tablet      lansoprazole (PREVACID) 15 MG delayed release capsule Take by mouth every morning (before breakfast)      lidocaine (XYLOCAINE) 5 % ointment Apply topically as needed      lisinopril (PRINIVIL;ZESTRIL) 10 MG tablet lisinopril 10 mg tablet      simvastatin (ZOCOR) 40 MG tablet Take by mouth      valACYclovir (VALTREX) 1 g tablet Take by mouth       No current facility-administered medications on file prior to visit.       Systems Review:  Review of

## 2024-03-13 RX ORDER — MECLIZINE HYDROCHLORIDE 25 MG/1
25 TABLET ORAL 3 TIMES DAILY PRN
COMMUNITY
Start: 2024-01-31

## 2024-03-13 RX ORDER — LISINOPRIL 20 MG/1
20 TABLET ORAL NIGHTLY
COMMUNITY

## 2024-03-13 RX ORDER — ERGOCALCIFEROL 1.25 MG/1
50000 CAPSULE ORAL WEEKLY
COMMUNITY
Start: 2024-01-31

## 2024-03-13 RX ORDER — SILDENAFIL 50 MG/1
50 TABLET, FILM COATED ORAL PRN
COMMUNITY
Start: 2024-02-01

## 2024-03-21 ENCOUNTER — ANESTHESIA EVENT (OUTPATIENT)
Facility: HOSPITAL | Age: 54
End: 2024-03-21
Payer: COMMERCIAL

## 2024-03-22 ENCOUNTER — HOSPITAL ENCOUNTER (OUTPATIENT)
Facility: HOSPITAL | Age: 54
Setting detail: OUTPATIENT SURGERY
Discharge: HOME | End: 2024-03-22
Attending: SURGERY | Admitting: SURGERY
Payer: COMMERCIAL

## 2024-03-22 ENCOUNTER — ANESTHESIA (OUTPATIENT)
Facility: HOSPITAL | Age: 54
End: 2024-03-22
Payer: COMMERCIAL

## 2024-03-22 VITALS
HEART RATE: 85 BPM | BODY MASS INDEX: 39.06 KG/M2 | WEIGHT: 279 LBS | OXYGEN SATURATION: 94 % | DIASTOLIC BLOOD PRESSURE: 113 MMHG | HEIGHT: 71 IN | SYSTOLIC BLOOD PRESSURE: 153 MMHG | TEMPERATURE: 98.6 F | RESPIRATION RATE: 20 BRPM

## 2024-03-22 DIAGNOSIS — Z09 S/P UMBILICAL HERNIA REPAIR, FOLLOW-UP EXAM: Primary | ICD-10-CM

## 2024-03-22 PROCEDURE — A4217 STERILE WATER/SALINE, 500 ML: HCPCS | Performed by: SURGERY

## 2024-03-22 PROCEDURE — 2720000010 HC SURG SUPPLY STERILE: Performed by: SURGERY

## 2024-03-22 PROCEDURE — 3600000002 HC SURGERY LEVEL 2 BASE: Performed by: SURGERY

## 2024-03-22 PROCEDURE — C1781 MESH (IMPLANTABLE): HCPCS | Performed by: SURGERY

## 2024-03-22 PROCEDURE — 7100000001 HC PACU RECOVERY - ADDTL 15 MIN: Performed by: SURGERY

## 2024-03-22 PROCEDURE — 3600000012 HC SURGERY LEVEL 2 ADDTL 15MIN: Performed by: SURGERY

## 2024-03-22 PROCEDURE — 6370000000 HC RX 637 (ALT 250 FOR IP): Performed by: NURSE ANESTHETIST, CERTIFIED REGISTERED

## 2024-03-22 PROCEDURE — 94761 N-INVAS EAR/PLS OXIMETRY MLT: CPT

## 2024-03-22 PROCEDURE — 6360000002 HC RX W HCPCS: Performed by: NURSE ANESTHETIST, CERTIFIED REGISTERED

## 2024-03-22 PROCEDURE — 3700000000 HC ANESTHESIA ATTENDED CARE: Performed by: SURGERY

## 2024-03-22 PROCEDURE — 49594 RPR AA HRN 1ST 3-10 NCR/STRN: CPT | Performed by: SURGERY

## 2024-03-22 PROCEDURE — 2500000003 HC RX 250 WO HCPCS: Performed by: NURSE ANESTHETIST, CERTIFIED REGISTERED

## 2024-03-22 PROCEDURE — 6370000000 HC RX 637 (ALT 250 FOR IP): Performed by: SURGERY

## 2024-03-22 PROCEDURE — 6360000002 HC RX W HCPCS: Performed by: SURGERY

## 2024-03-22 PROCEDURE — 2580000003 HC RX 258: Performed by: NURSE ANESTHETIST, CERTIFIED REGISTERED

## 2024-03-22 PROCEDURE — 2580000003 HC RX 258: Performed by: SURGERY

## 2024-03-22 PROCEDURE — 2709999900 HC NON-CHARGEABLE SUPPLY: Performed by: SURGERY

## 2024-03-22 PROCEDURE — 2700000000 HC OXYGEN THERAPY PER DAY

## 2024-03-22 PROCEDURE — 3700000001 HC ADD 15 MINUTES (ANESTHESIA): Performed by: SURGERY

## 2024-03-22 PROCEDURE — 7100000011 HC PHASE II RECOVERY - ADDTL 15 MIN: Performed by: SURGERY

## 2024-03-22 PROCEDURE — 7100000000 HC PACU RECOVERY - FIRST 15 MIN: Performed by: SURGERY

## 2024-03-22 PROCEDURE — 7100000010 HC PHASE II RECOVERY - FIRST 15 MIN: Performed by: SURGERY

## 2024-03-22 DEVICE — MESH HERN DIA4.5IN CIR W/ ECHO PS POS SYS VENTRALIGHT ST: Type: IMPLANTABLE DEVICE | Site: UMBILICAL | Status: FUNCTIONAL

## 2024-03-22 RX ORDER — BUPIVACAINE HYDROCHLORIDE 2.5 MG/ML
INJECTION, SOLUTION EPIDURAL; INFILTRATION; INTRACAUDAL PRN
Status: DISCONTINUED | OUTPATIENT
Start: 2024-03-22 | End: 2024-03-22 | Stop reason: ALTCHOICE

## 2024-03-22 RX ORDER — ONDANSETRON 2 MG/ML
INJECTION INTRAMUSCULAR; INTRAVENOUS PRN
Status: DISCONTINUED | OUTPATIENT
Start: 2024-03-22 | End: 2024-03-22 | Stop reason: SDUPTHER

## 2024-03-22 RX ORDER — ACETAMINOPHEN 325 MG/1
650 TABLET ORAL
Status: DISCONTINUED | OUTPATIENT
Start: 2024-03-22 | End: 2024-03-22 | Stop reason: HOSPADM

## 2024-03-22 RX ORDER — SODIUM CHLORIDE 9 MG/ML
INJECTION, SOLUTION INTRAVENOUS PRN
Status: DISCONTINUED | OUTPATIENT
Start: 2024-03-22 | End: 2024-03-22 | Stop reason: HOSPADM

## 2024-03-22 RX ORDER — PROPOFOL 10 MG/ML
INJECTION, EMULSION INTRAVENOUS PRN
Status: DISCONTINUED | OUTPATIENT
Start: 2024-03-22 | End: 2024-03-22 | Stop reason: SDUPTHER

## 2024-03-22 RX ORDER — HYDROCODONE BITARTRATE AND ACETAMINOPHEN 5; 325 MG/1; MG/1
1 TABLET ORAL EVERY 4 HOURS PRN
Qty: 18 TABLET | Refills: 0 | Status: SHIPPED | OUTPATIENT
Start: 2024-03-22 | End: 2024-03-25

## 2024-03-22 RX ORDER — DEXAMETHASONE SODIUM PHOSPHATE 4 MG/ML
INJECTION, SOLUTION INTRA-ARTICULAR; INTRALESIONAL; INTRAMUSCULAR; INTRAVENOUS; SOFT TISSUE PRN
Status: DISCONTINUED | OUTPATIENT
Start: 2024-03-22 | End: 2024-03-22 | Stop reason: SDUPTHER

## 2024-03-22 RX ORDER — HYDROCODONE BITARTRATE AND ACETAMINOPHEN 5; 325 MG/1; MG/1
1 TABLET ORAL ONCE
Status: COMPLETED | OUTPATIENT
Start: 2024-03-22 | End: 2024-03-22

## 2024-03-22 RX ORDER — SODIUM CHLORIDE, SODIUM LACTATE, POTASSIUM CHLORIDE, CALCIUM CHLORIDE 600; 310; 30; 20 MG/100ML; MG/100ML; MG/100ML; MG/100ML
INJECTION, SOLUTION INTRAVENOUS CONTINUOUS
Status: DISCONTINUED | OUTPATIENT
Start: 2024-03-22 | End: 2024-03-22 | Stop reason: HOSPADM

## 2024-03-22 RX ORDER — LIDOCAINE HYDROCHLORIDE 20 MG/ML
INJECTION, SOLUTION EPIDURAL; INFILTRATION; INTRACAUDAL; PERINEURAL PRN
Status: DISCONTINUED | OUTPATIENT
Start: 2024-03-22 | End: 2024-03-22 | Stop reason: SDUPTHER

## 2024-03-22 RX ORDER — SODIUM CHLORIDE 0.9 % (FLUSH) 0.9 %
5-40 SYRINGE (ML) INJECTION PRN
Status: DISCONTINUED | OUTPATIENT
Start: 2024-03-22 | End: 2024-03-22 | Stop reason: HOSPADM

## 2024-03-22 RX ORDER — DIPHENHYDRAMINE HYDROCHLORIDE 50 MG/ML
12.5 INJECTION INTRAMUSCULAR; INTRAVENOUS
Status: DISCONTINUED | OUTPATIENT
Start: 2024-03-22 | End: 2024-03-22 | Stop reason: HOSPADM

## 2024-03-22 RX ORDER — PROCHLORPERAZINE EDISYLATE 5 MG/ML
5 INJECTION INTRAMUSCULAR; INTRAVENOUS
Status: DISCONTINUED | OUTPATIENT
Start: 2024-03-22 | End: 2024-03-22 | Stop reason: HOSPADM

## 2024-03-22 RX ORDER — ROCURONIUM BROMIDE 10 MG/ML
INJECTION, SOLUTION INTRAVENOUS PRN
Status: DISCONTINUED | OUTPATIENT
Start: 2024-03-22 | End: 2024-03-22 | Stop reason: SDUPTHER

## 2024-03-22 RX ORDER — KETOROLAC TROMETHAMINE 15 MG/ML
INJECTION, SOLUTION INTRAMUSCULAR; INTRAVENOUS PRN
Status: DISCONTINUED | OUTPATIENT
Start: 2024-03-22 | End: 2024-03-22 | Stop reason: SDUPTHER

## 2024-03-22 RX ORDER — FAMOTIDINE 20 MG/1
20 TABLET, FILM COATED ORAL ONCE
Status: COMPLETED | OUTPATIENT
Start: 2024-03-22 | End: 2024-03-22

## 2024-03-22 RX ORDER — SODIUM CHLORIDE 0.9 % (FLUSH) 0.9 %
5-40 SYRINGE (ML) INJECTION EVERY 12 HOURS SCHEDULED
Status: DISCONTINUED | OUTPATIENT
Start: 2024-03-22 | End: 2024-03-22 | Stop reason: HOSPADM

## 2024-03-22 RX ORDER — MIDAZOLAM HYDROCHLORIDE 1 MG/ML
INJECTION INTRAMUSCULAR; INTRAVENOUS PRN
Status: DISCONTINUED | OUTPATIENT
Start: 2024-03-22 | End: 2024-03-22 | Stop reason: SDUPTHER

## 2024-03-22 RX ORDER — FENTANYL CITRATE 50 UG/ML
INJECTION, SOLUTION INTRAMUSCULAR; INTRAVENOUS PRN
Status: DISCONTINUED | OUTPATIENT
Start: 2024-03-22 | End: 2024-03-22 | Stop reason: SDUPTHER

## 2024-03-22 RX ORDER — NALOXONE HYDROCHLORIDE 0.4 MG/ML
INJECTION, SOLUTION INTRAMUSCULAR; INTRAVENOUS; SUBCUTANEOUS PRN
Status: DISCONTINUED | OUTPATIENT
Start: 2024-03-22 | End: 2024-03-22 | Stop reason: HOSPADM

## 2024-03-22 RX ORDER — FENTANYL CITRATE 50 UG/ML
25 INJECTION, SOLUTION INTRAMUSCULAR; INTRAVENOUS EVERY 5 MIN PRN
Status: DISCONTINUED | OUTPATIENT
Start: 2024-03-22 | End: 2024-03-22 | Stop reason: HOSPADM

## 2024-03-22 RX ORDER — SUCCINYLCHOLINE/SOD CL,ISO/PF 100 MG/5ML
SYRINGE (ML) INTRAVENOUS PRN
Status: DISCONTINUED | OUTPATIENT
Start: 2024-03-22 | End: 2024-03-22 | Stop reason: SDUPTHER

## 2024-03-22 RX ORDER — PHENYLEPHRINE HCL IN 0.9% NACL 1 MG/10 ML
SYRINGE (ML) INTRAVENOUS PRN
Status: DISCONTINUED | OUTPATIENT
Start: 2024-03-22 | End: 2024-03-22 | Stop reason: SDUPTHER

## 2024-03-22 RX ORDER — ONDANSETRON 2 MG/ML
4 INJECTION INTRAMUSCULAR; INTRAVENOUS
Status: DISCONTINUED | OUTPATIENT
Start: 2024-03-22 | End: 2024-03-22 | Stop reason: HOSPADM

## 2024-03-22 RX ORDER — MEPERIDINE HYDROCHLORIDE 25 MG/ML
12.5 INJECTION INTRAMUSCULAR; INTRAVENOUS; SUBCUTANEOUS EVERY 5 MIN PRN
Status: DISCONTINUED | OUTPATIENT
Start: 2024-03-22 | End: 2024-03-22 | Stop reason: HOSPADM

## 2024-03-22 RX ADMIN — SUGAMMADEX 300 MG: 100 INJECTION, SOLUTION INTRAVENOUS at 11:33

## 2024-03-22 RX ADMIN — LIDOCAINE HYDROCHLORIDE 100 MG: 20 INJECTION, SOLUTION EPIDURAL; INFILTRATION; INTRACAUDAL; PERINEURAL at 11:07

## 2024-03-22 RX ADMIN — PROPOFOL 200 MG: 10 INJECTION, EMULSION INTRAVENOUS at 11:07

## 2024-03-22 RX ADMIN — SODIUM CHLORIDE, POTASSIUM CHLORIDE, SODIUM LACTATE AND CALCIUM CHLORIDE: 600; 310; 30; 20 INJECTION, SOLUTION INTRAVENOUS at 10:32

## 2024-03-22 RX ADMIN — FAMOTIDINE 20 MG: 20 TABLET ORAL at 10:16

## 2024-03-22 RX ADMIN — HYDROMORPHONE HYDROCHLORIDE 0.5 MG: 1 INJECTION, SOLUTION INTRAMUSCULAR; INTRAVENOUS; SUBCUTANEOUS at 12:15

## 2024-03-22 RX ADMIN — HYDROMORPHONE HYDROCHLORIDE 0.5 MG: 1 INJECTION, SOLUTION INTRAMUSCULAR; INTRAVENOUS; SUBCUTANEOUS at 11:55

## 2024-03-22 RX ADMIN — FENTANYL CITRATE 100 MCG: 50 INJECTION INTRAMUSCULAR; INTRAVENOUS at 11:07

## 2024-03-22 RX ADMIN — ONDANSETRON 4 MG: 2 INJECTION INTRAMUSCULAR; INTRAVENOUS at 11:30

## 2024-03-22 RX ADMIN — Medication 160 MG: at 11:07

## 2024-03-22 RX ADMIN — WATER 3000 MG: 1 INJECTION, SOLUTION INTRAMUSCULAR; INTRAVENOUS; SUBCUTANEOUS at 11:10

## 2024-03-22 RX ADMIN — HYDROCODONE BITARTRATE AND ACETAMINOPHEN 1 TABLET: 5; 325 TABLET ORAL at 12:37

## 2024-03-22 RX ADMIN — Medication 100 MCG: at 11:23

## 2024-03-22 RX ADMIN — MIDAZOLAM 2 MG: 1 INJECTION, SOLUTION INTRAMUSCULAR; INTRAVENOUS at 11:00

## 2024-03-22 RX ADMIN — KETOROLAC TROMETHAMINE 15 MG: 15 INJECTION, SOLUTION INTRAMUSCULAR; INTRAVENOUS at 11:45

## 2024-03-22 RX ADMIN — DEXAMETHASONE SODIUM PHOSPHATE 4 MG: 4 INJECTION INTRA-ARTICULAR; INTRALESIONAL; INTRAMUSCULAR; INTRAVENOUS; SOFT TISSUE at 11:17

## 2024-03-22 RX ADMIN — ROCURONIUM BROMIDE 30 MG: 10 INJECTION, SOLUTION INTRAVENOUS at 11:14

## 2024-03-22 RX ADMIN — ROCURONIUM BROMIDE 10 MG: 10 INJECTION, SOLUTION INTRAVENOUS at 11:07

## 2024-03-22 ASSESSMENT — PAIN DESCRIPTION - DESCRIPTORS
DESCRIPTORS: ACHING;DULL;GNAWING
DESCRIPTORS: ACHING

## 2024-03-22 ASSESSMENT — PAIN - FUNCTIONAL ASSESSMENT
PAIN_FUNCTIONAL_ASSESSMENT: ACTIVITIES ARE NOT PREVENTED
PAIN_FUNCTIONAL_ASSESSMENT: 0-10
PAIN_FUNCTIONAL_ASSESSMENT: ACTIVITIES ARE NOT PREVENTED

## 2024-03-22 ASSESSMENT — PAIN SCALES - GENERAL
PAINLEVEL_OUTOF10: 0
PAINLEVEL_OUTOF10: 4
PAINLEVEL_OUTOF10: 0
PAINLEVEL_OUTOF10: 6
PAINLEVEL_OUTOF10: 4
PAINLEVEL_OUTOF10: 9
PAINLEVEL_OUTOF10: 0
PAINLEVEL_OUTOF10: 0
PAINLEVEL_OUTOF10: 10

## 2024-03-22 ASSESSMENT — PAIN DESCRIPTION - ORIENTATION
ORIENTATION: ANTERIOR
ORIENTATION: ANTERIOR;MID

## 2024-03-22 ASSESSMENT — PAIN DESCRIPTION - LOCATION: LOCATION: ABDOMEN

## 2024-03-22 NOTE — PERIOP NOTE
Patient /Family /Designee has been informed that [facility] is not responsible for patient belongings per policy and the signed [facility] Patient Agreement document.  Personal items should be sent home or checked in with security.  Patient /Family /Designee selected the following action:                            []  Send personal items home with a family member or friend                                                 []  Check in personal items with security, excluding clothing                            [x]  Maintain personal items at the bedside, against recommendation                                 by David Forte [facility]     1 belongings bag sent to PACU

## 2024-03-22 NOTE — ANESTHESIA POSTPROCEDURE EVALUATION
Department of Anesthesiology  Postprocedure Note    Patient: Jasson Draper  MRN: [id number]  YOB: 1970  Date of evaluation: 3/22/2024    Procedure Summary       Date: 03/22/24 Room / Location: [facility] MAIN 01 / [facility] MAIN OR    Anesthesia Start: 1100 Anesthesia Stop: 1145    Procedure: LAPAROSCOPIC UMBILICAL HERNIA REPAIR WITH MESH (Abdomen) Diagnosis:       Incarcerated umbilical hernia      (Incarcerated umbilical hernia [K42.0])    Surgeons: Ana Schuler DO Responsible Provider: Khoi Kramer MD    Anesthesia Type: General ASA Status: 3            Anesthesia Type: General    Bonnie Phase I: Bonnie Score: 10    Bonnie Phase II: Bonnie Score: 10    Anesthesia Post Evaluation    Patient location during evaluation: bedside  Patient participation: complete - patient participated  Airway patency: patent  Cardiovascular status: hemodynamically stable  Respiratory status: acceptable  Hydration status: stable    No notable events documented.

## 2024-03-22 NOTE — DISCHARGE INSTRUCTIONS
Post Operative Discharge Instructions    No driving for 24 hours after surgery and off of prescription pain medication.    Avoid activities that bump or cause jarring movements at the surgical site for 10 days.    No lifting more than 10-15 pounds for 6 weeks after surgery or until cleared for activity at your follow up.    Walking is encouraged after surgery.    Stairs are ok to climb.      DIET:    Diet as tolerated. Start with liquids then advance your diet based on how you fell.    No alcoholic beverages for 24 hours after surgery or while on antibiotics or pain mdications.    Drink plenty of water.        MEDICATIONS:    Use daily stool softners (over the counter such as Colace or Senekot) while on pain medications.     Resume pre-operative medications. If you are on any blood thinners see special instructions below.    Use prescriptions given or Tylenol, Ibuprofen as needed for pain.    Do not use more than 4000mg of Tylenol (acetaminophen) per day. Be aware this may be  in your prescription medication as well.    Be aware narcotic prescriptions are tightly controlled in the [facility]. If requiring more than one refill, a follow up appointment will be required.      WOUND CARE:      You have skin glue on  your incisions, you may shower in 24 hours and pat dry. Glue will fall off on it's own    Do not tub bathe, swim, or soak incisions until cleared to do so at your follow up.    Ice bag to the affected area; 20 minutes on and 20 minutes off if desired.      FOLLOW UP CARE:    You should have an appointment scheduled within 14 days after surgery. If this is not yet scheduled, call the office.  Any forms that you need filled out regarding your medical care can be brought to the office at follow up appointment of faxed to: [phone]        CALL DOCTOR IF:  Temperature is over 101 degrees, a slight fever can be normal 24-48 hour after surgery.  Nausea & vomiting that persists more than 24 hours after

## 2024-03-22 NOTE — H&P
Expand All Collapse All    CC:        Chief Complaint   Patient presents with    New Patient       Umbilical hernia          Assessment:      ICD-10-CM     1. Incarcerated umbilical hernia  K42.0 SCHEDULE SURGERY             Plan: He has a symptomatic umbilical hernia incarcerated with fat.  I recommended laparoscopic umbilical hernia repair with mesh.  The risks and benefits of the procedure were reviewed with the patient including infection, bleeding, need for repeat procedure,injury to surrounding structures, recurrent hernia. Post operative expectations including lifting restrictions were reviewed. Questions were answered.            HPI:  Jasson Draper is a 53 y.o. male who is referred by Bridget Sabillon PA for umbilical hernia.  He states he noticed bulging at the umbilicus over a year ago.  Since that time the hernia has gotten much larger and painful.  He states touching this area causes discomfort as well as straining with bowel movements.  No changes to bowel or bladder habits.  He denies any previous abdominal surgery.  He states his weight is stable.  No nausea or vomiting.     Allergies:  No Known Allergies     Medication Review:         Current Outpatient Medications on File Prior to Visit   Medication Sig Dispense Refill    Cholecalciferol 50 MCG (2000 UT) TABS Take by mouth        diclofenac sodium (VOLTAREN) 1 % GEL Apply 2 g topically every 6 hours as needed        ibuprofen (ADVIL;MOTRIN) 800 MG tablet ceived the following from [facility]: Outside name: ibuprofen (MOTRIN) 800 mg tablet        lansoprazole (PREVACID) 15 MG delayed release capsule Take by mouth every morning (before breakfast)        lidocaine (XYLOCAINE) 5 % ointment Apply topically as needed        lisinopril (PRINIVIL;ZESTRIL) 10 MG tablet lisinopril 10 mg tablet        simvastatin (ZOCOR) 40 MG tablet Take by mouth        valACYclovir (VALTREX) 1 g tablet Take by mouth          No current facility-administered

## 2024-03-22 NOTE — OP NOTE
Laparoscopic Umbilical Hernia Repair with Mesh    Patient Name: Jasson Draper     SURGERY DATE: 24     : 1970     AGE: 53 y.o.     Surgeon: Ana Schuler DO    Anesthesiologist: Anesthesiologist: Khoi Kramer MD  CRNA: Ivanna Easton APRN - CRNA     Anesthesia: General    Surgical Assist: Love Neves    PreOp DX: Hernia, umbilical, with obstruction [K42.0]     PostOp DX: same     Procedure: Laparoscopic umbilical hernia repair with mesh,12cm    Implant:   Implant Name Type Inv. Item Serial No.  Lot No. LRB No. Used Action   MESH ANITHA DIA4.5IN CIR W/ ECHO PS POS SYS VENTRALIGHT ST - [id number]  MESH ANITHA DIA4.5IN CIR W/ ECHO PS POS SYS VENTRALIGHT ST  BARD DAVOL-WD [id number] N/A 1 Implanted         Procedure Details: After informed consent was obtained the patient was taken to the operating room and placed in the supine position.  General anesthesia was administered by the anesthetist to titrate to effect.  The abdomen was prepped and draped in the usual sterile fashion and a timeout procedure was performed.  Next using 11 blade scalpel a 5 mm incision was made in the left upper quadrant.  A Veress needle was used to establish pneumoperitoneum and a 5 mm trocar was inserted.  The laparoscope was inserted.  Next under direct visualization 3 additional trochars were placed, one 12 mm in the left lower quadrant and 2 5 mm in the right upper and right lower quadrant.  Incarcerated omentum was seen in the umbilical hernia.  Using the LigaSure device the incarcerated omentum was reduced.  The fascial defect was approximately 3.0 cm.  No other defects were identified.  A 11.4 cm circular echo mesh was then inserted into the abdomen. The mesh was then grasped at the suture and brought up to the abdominal wall using the Javad Ochoa device and balloon was inflated.  The mesh laid completely flat up against the abdominal wall.  The securestrap device was used to tack all along the

## 2024-03-22 NOTE — ANESTHESIA PRE PROCEDURE
Department of Anesthesiology  Preprocedure Note       Name:  Jasson Draper   Age:  53 y.o.  :  1970                                          MRN:  [id number]         Date:  3/22/2024      Surgeon: Surgeon(s):  Ana Schuler DO    Procedure: Procedure(s):  LAPAROSCOPIC UMBILICAL HERNIA REPAIR WITH MESH    Medications prior to admission:   Prior to Admission medications    Medication Sig Start Date End Date Taking? Authorizing Provider   lisinopril (PRINIVIL;ZESTRIL) 20 MG tablet Take 1 tablet by mouth nightly   Yes ProviderJordan MD   vitamin D (ERGOCALCIFEROL) 1.25 MG (29856 UT) CAPS capsule Take 1 capsule by mouth Once a week at 5 PM 24  Yes ProviderJordan MD   meclizine (ANTIVERT) 25 MG tablet Take 1 tablet by mouth 3 times daily as needed 24  Yes ProviderJordan MD   sildenafil (VIAGRA) 50 MG tablet Take 1 tablet by mouth as needed 24  Yes ProviderJordan MD   lansoprazole (PREVACID) 15 MG delayed release capsule Take by mouth every morning (before breakfast)    Automatic Reconciliation, Ar   simvastatin (ZOCOR) 40 MG tablet Take 1 tablet by mouth nightly    Automatic Reconciliation, Ar   valACYclovir (VALTREX) 1 g tablet Take 1 tablet by mouth daily    Automatic Reconciliation, Ar       Current medications:    Current Facility-Administered Medications   Medication Dose Route Frequency Provider Last Rate Last Admin   • sodium chloride flush 0.9 % injection 5-40 mL  5-40 mL IntraVENous 2 times per day Ana Schuler, DO       • sodium chloride flush 0.9 % injection 5-40 mL  5-40 mL IntraVENous PRN Ana Schuler, DO       • 0.9 % sodium chloride infusion   IntraVENous PRN Rl Schulerle, DO       • ceFAZolin (ANCEF) 3,000 mg in sterile water 30 mL IV Syringe  3,000 mg IntraVENous On Call to OR Ana Schuler DO       • lactated ringers IV soln infusion   IntraVENous Continuous Ivanna Easton, ELISABET - CRNA       • sodium chloride flush 0.9 %

## 2024-04-08 ENCOUNTER — OFFICE VISIT (OUTPATIENT)
Age: 54
End: 2024-04-08

## 2024-04-08 VITALS
BODY MASS INDEX: 38.42 KG/M2 | TEMPERATURE: 97.1 F | SYSTOLIC BLOOD PRESSURE: 126 MMHG | RESPIRATION RATE: 14 BRPM | HEIGHT: 71 IN | HEART RATE: 96 BPM | DIASTOLIC BLOOD PRESSURE: 82 MMHG | OXYGEN SATURATION: 96 % | WEIGHT: 274.4 LBS

## 2024-04-08 DIAGNOSIS — Z09 S/P UMBILICAL HERNIA REPAIR, FOLLOW-UP EXAM: Primary | ICD-10-CM

## 2024-04-08 PROCEDURE — 99024 POSTOP FOLLOW-UP VISIT: CPT | Performed by: SURGERY

## 2024-04-08 ASSESSMENT — ENCOUNTER SYMPTOMS: ABDOMINAL PAIN: 0

## 2024-04-08 NOTE — PROGRESS NOTES
CC:   Chief Complaint   Patient presents with    Post-Op Check     Umbilical Hernia 03/22/24        Assessment:    ICD-10-CM    1. S/P umbilical hernia repair, follow-up exam  Z09           Plan: He has a seroma at the umbilicus which I expect will continue to absorb over time.  He should continue with lifting restrictions of no more than 15 pounds for total of 6 weeks.  I would like to see him back in 2 months to ensure resolution of his seroma or sooner should he have any questions or concerns.  He agrees with this plan.        HPI:  Jasson Draper is a 53 y.o. male who is status post laparoscopic umbilical hernia repair with mesh on 3/22/2024.  He reports no fevers, chills or drainage from his incision.    Allergies:  No Known Allergies    Medication Review:  Current Outpatient Medications on File Prior to Visit   Medication Sig Dispense Refill    lisinopril (PRINIVIL;ZESTRIL) 20 MG tablet Take 1 tablet by mouth nightly      vitamin D (ERGOCALCIFEROL) 1.25 MG (54697 UT) CAPS capsule Take 1 capsule by mouth Once a week at 5 PM      meclizine (ANTIVERT) 25 MG tablet Take 1 tablet by mouth 3 times daily as needed      sildenafil (VIAGRA) 50 MG tablet Take 1 tablet by mouth as needed      lansoprazole (PREVACID) 15 MG delayed release capsule Take by mouth every morning (before breakfast)      simvastatin (ZOCOR) 40 MG tablet Take 1 tablet by mouth nightly      valACYclovir (VALTREX) 1 g tablet Take 1 tablet by mouth daily       No current facility-administered medications on file prior to visit.       Systems Review:  Review of Systems   Constitutional:  Negative for fever.   Gastrointestinal:  Negative for abdominal pain.       PMH:  Past Medical History:   Diagnosis Date    Chronic cardiac arrhythmia     EKG shows PACs and PVCs    Elevated PSA     Herpes simplex infection     type 2    HTN (hypertension)     Morbid obesity (HCC) 03/2024    BMI 38    CHASE on CPAP     Pure hypercholesterolemia     Umbilical hernia

## 2024-04-08 NOTE — PROGRESS NOTES
Jasson Draper is a 53 y.o. male (: 1970)     Chief Complaint   Patient presents with    Post-Op Check     Umbilical Hernia 24       Medication list and allergies have been reviewed with Jasson DESAI Marikasourav and updated as of today's date.     I have gone over all Medical, Surgical and Social History with Jasson Draper and updated/added the information accordingly.      1. Have you been to the ER, urgent care clinic since your last visit?  Hospitalized since your last visit?No    2. Have you seen or consulted any other health care providers outside of the [facility] since your last visit?  Include any pap smears or colon screening. No

## 2024-04-10 ENCOUNTER — TELEPHONE (OUTPATIENT)
Age: 54
End: 2024-04-10

## 2024-04-10 NOTE — TELEPHONE ENCOUNTER
Patient is asking for a work note to go back this Friday. Patient would like a call regarding this.

## 2025-06-17 ENCOUNTER — HOSPITAL ENCOUNTER (OUTPATIENT)
Facility: HOSPITAL | Age: 55
Discharge: HOME | End: 2025-06-20

## 2025-06-17 ENCOUNTER — OFFICE VISIT (OUTPATIENT)
Age: 55
End: 2025-06-17
Payer: COMMERCIAL

## 2025-06-17 VITALS — WEIGHT: 285 LBS | HEIGHT: 71 IN | BODY MASS INDEX: 39.9 KG/M2 | RESPIRATION RATE: 14 BRPM

## 2025-06-17 DIAGNOSIS — M99.09 SOMATIC DYSFUNCTION OF ABDOMINAL REGION: ICD-10-CM

## 2025-06-17 DIAGNOSIS — M99.08 RIB CAGE REGION SOMATIC DYSFUNCTION: ICD-10-CM

## 2025-06-17 DIAGNOSIS — M77.8 TENDINITIS OF BOTH HANDS: ICD-10-CM

## 2025-06-17 DIAGNOSIS — M99.06 LOWER LIMB REGION SOMATIC DYSFUNCTION: ICD-10-CM

## 2025-06-17 DIAGNOSIS — M99.02 THORACIC REGION SOMATIC DYSFUNCTION: ICD-10-CM

## 2025-06-17 DIAGNOSIS — M99.01 CERVICAL SOMATIC DYSFUNCTION: ICD-10-CM

## 2025-06-17 DIAGNOSIS — M99.05 PELVIC REGION SOMATIC DYSFUNCTION: ICD-10-CM

## 2025-06-17 DIAGNOSIS — M99.04 SACRAL REGION SOMATIC DYSFUNCTION: ICD-10-CM

## 2025-06-17 DIAGNOSIS — M77.8 TENDINITIS OF BOTH HANDS: Primary | ICD-10-CM

## 2025-06-17 DIAGNOSIS — M99.03 SOMATIC DYSFUNCTION OF LUMBAR REGION: ICD-10-CM

## 2025-06-17 DIAGNOSIS — M77.02 MEDIAL EPICONDYLITIS, LEFT: ICD-10-CM

## 2025-06-17 DIAGNOSIS — M99.07 UPPER EXTREMITY SOMATIC DYSFUNCTION: ICD-10-CM

## 2025-06-17 PROCEDURE — 98929 OSTEOPATH MANJ 9-10 REGIONS: CPT | Performed by: FAMILY MEDICINE

## 2025-06-17 PROCEDURE — 99204 OFFICE O/P NEW MOD 45 MIN: CPT | Performed by: FAMILY MEDICINE

## 2025-06-17 NOTE — PROGRESS NOTES
Jasson Draper (:  1970) is a 54 y.o. male, New patient, here for evaluation of the following chief complaint(s):  Arm Pain (left) and Wrist Pain (alicia)     Assessment & Plan  1. Bilateral hand and wrist pain.  The symptoms suggest a potential diagnosis of tendinitis in the hands.  A prescription for diclofenac 50 mg, to be taken twice daily with meals, has been provided. He is also advised to continue using Voltaren gel.  An x-ray of both hands will be conducted to rule out arthritis.  A referral to occupational therapy has been made to assist in strengthening the hands. He will be provided with some stretches to do on his own.    2. Left arm pain.  The symptoms are indicative of golfer's elbow.  He is advised to use Voltaren gel and take diclofenac 50 mg twice daily with meals.  A referral to occupational therapy has been made to help manage the condition.  An x-ray will be conducted to ensure there are no other underlying issues.    Follow-up  The patient will follow up in 6 weeks.    Results  XR both hands pending    1. Tendinitis of both hands  -     XR HAND LEFT (MIN 3 VIEWS); Future  -     XR HAND RIGHT (MIN 3 VIEWS); Future  -     diclofenac sodium (VOLTAREN) 1 % GEL; Apply 2 g topically 4 times daily Both hands, left elbow, Topical, 4 TIMES DAILY Starting 2025, Disp-300 g, R-1, Normal  -     diclofenac (VOLTAREN) 50 MG EC tablet; Take 1 tablet by mouth with breakfast and with evening meal As needed pain., Disp-60 tablet, R-1Normal  -     Ambulatory referral to Occupational Therapy  2. Medial epicondylitis, left  -     diclofenac sodium (VOLTAREN) 1 % GEL; Apply 2 g topically 4 times daily Both hands, left elbow, Topical, 4 TIMES DAILY Starting 2025, Disp-300 g, R-1, Normal  -     diclofenac (VOLTAREN) 50 MG EC tablet; Take 1 tablet by mouth with breakfast and with evening meal As needed pain., Disp-60 tablet, R-1Normal  -     Ambulatory referral to Occupational Therapy  3.

## 2025-06-17 NOTE — PATIENT INSTRUCTIONS
Wrist flexor stretch    Extend your arm in front of you with your palm up.  Bend your wrist, pointing your hand toward the floor.  With your other hand, gently bend your wrist farther until you feel a mild to moderate stretch in your forearm.  Hold for at least 15 to 30 seconds. Repeat 2 to 4 times.  Wrist extensor stretch    Repeat steps 1 through 4 of the stretch above, but begin with your extended hand palm down.

## 2025-07-28 ENCOUNTER — TELEPHONE (OUTPATIENT)
Age: 55
End: 2025-07-28

## 2025-07-28 DIAGNOSIS — M77.8 TENDINITIS OF BOTH HANDS: Primary | ICD-10-CM

## 2025-07-28 DIAGNOSIS — M77.02 MEDIAL EPICONDYLITIS, LEFT: ICD-10-CM

## 2025-07-28 NOTE — TELEPHONE ENCOUNTER
Called patient to confirm appt for tomorrow 7/28. Patient needed to rs due to his mother being in and out of the hospital. Patient has not started PT and has requested a new referral faxed over. Patient states because he has cancelled twice he will need a new referral to schedule.

## 2025-08-12 ENCOUNTER — HOSPITAL ENCOUNTER (OUTPATIENT)
Age: 55
Discharge: HOME | End: 2025-08-15
Payer: COMMERCIAL

## 2025-08-12 DIAGNOSIS — R31.0 GROSS HEMATURIA: ICD-10-CM

## 2025-08-12 PROCEDURE — 6360000004 HC RX CONTRAST MEDICATION: Performed by: PHYSICIAN ASSISTANT

## 2025-08-12 PROCEDURE — 74178 CT ABD&PLV WO CNTR FLWD CNTR: CPT | Performed by: PHYSICIAN ASSISTANT

## 2025-08-12 RX ORDER — IOPAMIDOL 755 MG/ML
100 INJECTION, SOLUTION INTRAVASCULAR
Status: COMPLETED | OUTPATIENT
Start: 2025-08-12 | End: 2025-08-12

## 2025-08-12 RX ADMIN — IOPAMIDOL 100 ML: 755 INJECTION, SOLUTION INTRAVENOUS at 15:34

## 2025-08-21 ENCOUNTER — TELEPHONE (OUTPATIENT)
Age: 55
End: 2025-08-21

## (undated) DEVICE — TROCAR: Brand: KII® SLEEVE

## (undated) DEVICE — ELECTRODE PT RET AD L9FT HI MOIST COND ADH HYDRGEL CORDED

## (undated) DEVICE — APPLICATOR MEDICATED 26 CC SOLUTION HI LT ORNG CHLORAPREP

## (undated) DEVICE — Device

## (undated) DEVICE — SEALER LAP L37CM MARYLAND JAW OPN NANO COAT MULTIFUNCTIONAL

## (undated) DEVICE — GLOVE SURG SZ 65 L12IN FNGR THK79MIL GRN LTX FREE

## (undated) DEVICE — STAPLER INT DIA5MM 25 ABSRB STRP FIX DISP FOR HERN MESH

## (undated) DEVICE — GLOVE SURG SZ 6 CRM LTX FREE POLYISOPRENE POLYMER BEAD ANTI

## (undated) DEVICE — SOLUTION IRRIG 1000ML 0.9% SOD CHL USP POUR PLAS BTL

## (undated) DEVICE — TROCAR: Brand: KII® OPTICAL ACCESS SYSTEM

## (undated) DEVICE — SUTURE COAT VCRL PC 5 PRECIS COSM CONVENTIONAL CUT PRIM 3 8 J823H

## (undated) DEVICE — DRAPE TOWEL: Brand: CONVERTORS

## (undated) DEVICE — 2, DISPOSABLE SUCTION/IRRIGATOR WITH DISPOSABLE TIP: Brand: STRYKEFLOW

## (undated) DEVICE — LAPAROSCOPIC TROCAR SLEEVE/SINGLE USE: Brand: KII® OPTICAL ACCESS SYSTEM

## (undated) DEVICE — INSUFFLATION NEEDLE TO ESTABLISH PNEUMOPERITONEUM.: Brand: INSUFFLATION NEEDLE